# Patient Record
Sex: MALE | Race: WHITE
[De-identification: names, ages, dates, MRNs, and addresses within clinical notes are randomized per-mention and may not be internally consistent; named-entity substitution may affect disease eponyms.]

---

## 2023-03-09 ENCOUNTER — APPOINTMENT (OUTPATIENT)
Dept: RADIATION ONCOLOGY | Facility: CLINIC | Age: 78
End: 2023-03-09
Payer: MEDICARE

## 2023-03-09 VITALS — RESPIRATION RATE: 18 BRPM | BODY MASS INDEX: 39.99 KG/M2 | HEIGHT: 69 IN | WEIGHT: 270 LBS

## 2023-03-09 DIAGNOSIS — Z87.09 PERSONAL HISTORY OF OTHER DISEASES OF THE RESPIRATORY SYSTEM: ICD-10-CM

## 2023-03-09 DIAGNOSIS — Z87.891 PERSONAL HISTORY OF NICOTINE DEPENDENCE: ICD-10-CM

## 2023-03-09 DIAGNOSIS — Z86.79 PERSONAL HISTORY OF OTHER DISEASES OF THE CIRCULATORY SYSTEM: ICD-10-CM

## 2023-03-09 DIAGNOSIS — I10 ESSENTIAL (PRIMARY) HYPERTENSION: ICD-10-CM

## 2023-03-09 DIAGNOSIS — Z78.9 OTHER SPECIFIED HEALTH STATUS: ICD-10-CM

## 2023-03-09 PROBLEM — Z00.00 ENCOUNTER FOR PREVENTIVE HEALTH EXAMINATION: Status: ACTIVE | Noted: 2023-03-09

## 2023-03-09 PROCEDURE — 99204 OFFICE O/P NEW MOD 45 MIN: CPT

## 2023-03-09 NOTE — VITALS
[Maximal Pain Intensity: 2/10] : 2/10 [Least Pain Intensity: 0/10] : 0/10 [Pain Description/Quality: ___] : Pain description/quality: [unfilled] [Pain Duration: ___] : Pain duration: [unfilled] [Pain Location: ___] : Pain Location: [unfilled] [NoTreatment Scheduled] : no treatment scheduled [80: Normal activity with effort; some signs or symptoms of disease.] : 80: Normal activity with effort; some signs or symptoms of disease.  [ECOG Performance Status: 1 - Restricted in physically strenuous activity but ambulatory and able to carry out work of a light or sedentary nature] : Performance Status: 1 - Restricted in physically strenuous activity but ambulatory and able to carry out work of a light or sedentary nature, e.g., light house work, office work

## 2023-03-18 NOTE — REASON FOR VISIT
[Consideration of Curative Therapy] : consideration of curative therapy for [Spouse] : spouse [[employer] ] : provided by [employer]   [Interpreters_IDNumber] : [id number] [TWNoteComboBox1] : [place] Islander

## 2023-03-18 NOTE — HISTORY OF PRESENT ILLNESS
[FreeTextEntry1] : Mr. Helio Mcgill is a 76yo man presenting to discuss RT options for cancer of the head and neck.\par \par HPI:\par \par Thyroid US incidentally revealed a 2.4cm left level 3 node and 2 cm left level 2a node. 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 Nj 2/9/23 did not show any evidence of disease, recommended EUS with random biopsy of BOT, tonsil, and left neck dissection which was done 2/28/23. Intraoperatively found left level 2 ANTONIO. 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 He denies any new growths in the neck and is eating a normal diet. He denies weight loss. \par \par \par

## 2023-03-18 NOTE — PHYSICAL EXAM
[Sclera] : the sclera and conjunctiva were normal [PERRL With Normal Accommodation] : pupils were equal in size, round, reactive to light [] : no respiratory distress [Exaggerated Use Of Accessory Muscles For Inspiration] : no accessory muscle use [Normal] : oriented to person, place and time, the affect was normal, the mood was normal and not anxious [de-identified] : surgical incision along left neck healing well, no bleeding, appropriately tender, no masses in bilateral neck anitra regions

## 2023-03-18 NOTE — END OF VISIT
[] : Resident [FreeTextEntry3] : Agree with above. Discussed RT procedure and side effects. Side effects include but not limited to necrosis, PEG dependence, skin toxicity, pain, dry mouth, loss of taste, serious tissue toxicity. Will wait for final path report.  [Time Spent: ___ minutes] : I have spent [unfilled] minutes of time on the encounter. [>50% of the face to face encounter time was spent on counseling and/or coordination of care for ___] : Greater than 50% of the face to face encounter time was spent on counseling and/or coordination of care for [unfilled]

## 2023-03-20 ENCOUNTER — OUTPATIENT (OUTPATIENT)
Dept: OUTPATIENT SERVICES | Facility: HOSPITAL | Age: 78
LOS: 1 days | Discharge: HOME | End: 2023-03-20
Payer: MEDICARE

## 2023-03-20 PROCEDURE — 77334 RADIATION TREATMENT AID(S): CPT | Mod: 26

## 2023-03-21 DIAGNOSIS — C76.0 MALIGNANT NEOPLASM OF HEAD, FACE AND NECK: ICD-10-CM

## 2023-03-28 ENCOUNTER — APPOINTMENT (OUTPATIENT)
Dept: OTOLARYNGOLOGY | Facility: CLINIC | Age: 78
End: 2023-03-28
Payer: MEDICARE

## 2023-03-28 PROCEDURE — 92610 EVALUATE SWALLOWING FUNCTION: CPT | Mod: GN

## 2023-03-30 ENCOUNTER — NON-APPOINTMENT (OUTPATIENT)
Age: 78
End: 2023-03-30

## 2023-04-04 ENCOUNTER — NON-APPOINTMENT (OUTPATIENT)
Age: 78
End: 2023-04-04

## 2023-04-04 ENCOUNTER — APPOINTMENT (OUTPATIENT)
Dept: THORACIC SURGERY | Facility: CLINIC | Age: 78
End: 2023-04-04
Payer: MEDICARE

## 2023-04-04 VITALS
OXYGEN SATURATION: 87 % | HEART RATE: 64 BPM | BODY MASS INDEX: 38.65 KG/M2 | RESPIRATION RATE: 16 BRPM | HEIGHT: 70 IN | DIASTOLIC BLOOD PRESSURE: 69 MMHG | WEIGHT: 270 LBS | SYSTOLIC BLOOD PRESSURE: 122 MMHG

## 2023-04-04 DIAGNOSIS — C44.92 SQUAMOUS CELL CARCINOMA OF SKIN, UNSPECIFIED: ICD-10-CM

## 2023-04-04 PROCEDURE — 99205 OFFICE O/P NEW HI 60 MIN: CPT

## 2023-04-04 NOTE — CONSULT LETTER
[Dear  ___] : Dear  [unfilled], [Consult Letter:] : I had the pleasure of evaluating your patient, [unfilled]. [( Thank you for referring [unfilled] for consultation for _____ )] : Thank you for referring [unfilled] for consultation for [unfilled] [Please see my note below.] : Please see my note below. [Consult Closing:] : Thank you very much for allowing me to participate in the care of this patient.  If you have any questions, please do not hesitate to contact me. [Sincerely,] : Sincerely, [FreeTextEntry2] : Dr. Brett Garcia (rad/onc/ref) [FreeTextEntry3] : Ke Ruiz MD, FACS\par Chief, Division of Thoracic Surgery\par Director, Minimally Invasive Thoracic Surgery\par Department of Cardiovascular and Thoracic Surgery\par [facility]\par , Cardiovascular and Thoracic Surgery

## 2023-04-04 NOTE — ASSESSMENT
[FreeTextEntry1] : Mr. KEV DAIVS, 77 year old male, former smoker, w/ hx of HTN, HLD, asthma, CAD (no PCI), GALINA, who underwent a thyroid US revealing left neck mass and enlarged LN. S/p US guided bx on 02/01/2023 revealing metastatic keratinizing SCC with necrotic debris. S/p tonsil and left neck dissection on 02/28/2023 at [facility]. Plan for  RT (started 04/03/2023) with concurrent chemotherapy.\par \par Referred by Dr. Brett Garcia (rad/onc) for possible peg tube placement.\par \par I have reviewed the patient's medical records and diagnostic images at time of this office consultation and have made the following recommendation:\par 1. No scans available to review, however given patient's history I discussed EGD peg tube placement. Surgical approach reviewed with patient. Discussed risks in details. \par 2. He will re-think his option and call office with his decision. \par 3. Continue follow up with rad/onc.\par \par Recommendations reviewed with patient during this office visit, and all questions answered; Patient instructed on the importance of follow up and verbalizes understanding. \par \par \par I, TOMASZ House, personally performed the evaluation and management (E/M) services for this new patient.  That E/M includes conducting the initial examination, assessing all conditions, and establishing the plan of care.  Today, my ACP, José Fernandes NP, was here to observe my evaluation and management services for this patient to be followed going forward.

## 2023-04-04 NOTE — DATA REVIEWED
[FreeTextEntry1] : No scans available for review today. Only reports from CT Neck soft tissue with IV Contrast on 02/08/2023 and PET/CT on 02/06/2023.

## 2023-04-04 NOTE — HISTORY OF PRESENT ILLNESS
[FreeTextEntry1] : Mr. KEV DAVIS, 77 year old male, former smoker, w/ hx of HTN, HLD, asthma, CAD, GALINA, who underwent a thyroid US revealing left neck mass and enlarged LN. S/p US guided bx on 02/01/2023 revealing metastatic keratinizing SCC with necrotic debris. S/p tonsil and left neck dissection on 02/28/2023 at [facility]. Plan for  RT (started 04/03/2023) with concurrent chemotherapy.\par \par CT Neck soft tissue with IV Contrast on 02/08/2023 ([facility]):\par - Pathologically enlarged, heterogeneously enhancing left level 2 LNs.\par - There are additional rounded left level 3 nodes without pathologic enlargement.\par - No enhancing masslike less along the aerodisgestive tract. \par \par PET/CT on 02/06/2023 ([facility]):\par - FDG avid and non FDG avid left level 2 cystic/necrotic nodes biopsy proven metastatic squamous cell carcinoma.\par - FDG avid focus within the left inferior pole of thyroid, indeterminate. \par \par \par Patient is here today for CT Sx consultation, referred by Dr. Brett Garcia (rad/onc) for possible peg tube placement. Overall, he reports to be feeling well. Denies any chest pain, shortness of breath, cough, or hemoptysis.

## 2023-04-06 ENCOUNTER — NON-APPOINTMENT (OUTPATIENT)
Age: 78
End: 2023-04-06

## 2023-04-10 NOTE — DISEASE MANAGEMENT
[Clinical] : TNM Stage: c [DOMINIC] : DOMINIC [TTNM] : x [NTNM] : 3b [MTNM] : 0 [de-identified] : 66 Gy [de-identified] : oropharynx

## 2023-04-10 NOTE — REASON FOR VISIT
[Consideration of Curative Therapy] : consideration of curative therapy for [Spouse] : spouse [[employer] ] : provided by [employer]   [Interpreters_IDNumber] : [id number] [TWNoteComboBox1] : Lib[place]

## 2023-04-10 NOTE — HISTORY OF PRESENT ILLNESS
[FreeTextEntry1] : Mr. Helio Mcgill is a 76yo man presenting to discuss RT options for cancer of the head and neck.\par \par HPI:\par Thyroid US incidentally revealed a 2.4cm left level 3 node and 2 cm left level 2a node. 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 Nj 2/9/23 did not show any evidence of disease, recommended EUS with random biopsy of BOT, tonsil, and left neck dissection which was done 2/28/23. Intraoperatively found left level 2 ANTONIO. 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 He denies any new growths in the neck and is eating a normal diet. He denies weight loss. \par \par 4/6/2023 OTV. 4/33 fractions of radiation to oropharynx completed. c/o cough at times. No pain, dysphagia. Will get chemo tomorrow with Dr Pantoja @ [facility] . Pt will consider feeding tube placement when necessary. \par \par \par

## 2023-04-10 NOTE — REVIEW OF SYSTEMS
[Dysphagia: Grade 0] : Dysphagia: Grade 0 [Tinnitus - Grade 0] : Tinnitus - Grade 0 [Blurred Vision: Grade 0] : Blurred Vision: Grade 0 [Mucositis Oral: Grade 0] : Mucositis Oral: Grade 0  [Xerostomia: Grade 0] : Xerostomia: Grade 0 [Oral Pain: Grade 0] : Oral Pain: Grade 0 [Salivary duct inflammation: Grade 0] : Salivary duct inflammation: Grade 0 [Dysgeusia: Grade 0] : Dysgeusia: Grade 0 [Cough: Grade 1 - Mild symptoms; nonprescription intervention indicated] : Cough: Grade 1 - Mild symptoms; nonprescription intervention indicated [Negative] : Allergic/Immunologic [FreeTextEntry4] : left neck mass

## 2023-04-11 ENCOUNTER — NON-APPOINTMENT (OUTPATIENT)
Age: 78
End: 2023-04-11

## 2023-04-13 ENCOUNTER — NON-APPOINTMENT (OUTPATIENT)
Age: 78
End: 2023-04-13

## 2023-04-13 VITALS
HEART RATE: 89 BPM | RESPIRATION RATE: 18 BRPM | DIASTOLIC BLOOD PRESSURE: 83 MMHG | OXYGEN SATURATION: 88 % | SYSTOLIC BLOOD PRESSURE: 147 MMHG

## 2023-04-17 NOTE — REASON FOR VISIT
[Routine On-Treatment] : a routine on-treatment visit for [Spouse] : spouse [[employer] ] : provided by [employer]   [TWNoteComboBox1] : [place]raini [Interpreters_IDNumber] : [id number]

## 2023-04-17 NOTE — DISEASE MANAGEMENT
[Clinical] : TNM Stage: c [DOMINIC] : DOMINIC [TTNM] : x [NTNM] : 3b [MTNM] : 0 [de-identified] : 66 Gy [de-identified] : oropharynx

## 2023-04-17 NOTE — HISTORY OF PRESENT ILLNESS
[FreeTextEntry1] : Mr. Helio Mcgill is a 78yo man presenting to discuss RT options for cancer of the head and neck.\par \par HPI:\par Thyroid US incidentally revealed a 2.4cm left level 3 node and 2 cm left level 2a node. 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 Nj 2/9/23 did not show any evidence of disease, recommended EUS with random biopsy of BOT, tonsil, and left neck dissection which was done 2/28/23. Intraoperatively found left level 2 ANTONIO. 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 He denies any new growths in the neck and is eating a normal diet. He denies weight loss. \par \par 4/13/2023 OTV. 8/33 fractions of radiation to oropharynx completed. c/o cough at times. No pain, dysphagia. Pt has not started chemo yet with Dr Pantoja @ [facility] . Pt will consider feeding tube placement when necessary. Pt has lower O2 Sat since COVID infection about 2 years ago. \par \par \par

## 2023-05-18 ENCOUNTER — NON-APPOINTMENT (OUTPATIENT)
Age: 78
End: 2023-05-18

## 2023-05-23 ENCOUNTER — NON-APPOINTMENT (OUTPATIENT)
Age: 78
End: 2023-05-23

## 2023-05-23 VITALS
HEART RATE: 76 BPM | OXYGEN SATURATION: 96 % | DIASTOLIC BLOOD PRESSURE: 85 MMHG | RESPIRATION RATE: 18 BRPM | SYSTOLIC BLOOD PRESSURE: 150 MMHG

## 2023-05-24 PROCEDURE — 77334 RADIATION TREATMENT AID(S): CPT | Mod: 26

## 2023-05-26 ENCOUNTER — NON-APPOINTMENT (OUTPATIENT)
Age: 78
End: 2023-05-26

## 2023-06-22 ENCOUNTER — NON-APPOINTMENT (OUTPATIENT)
Age: 78
End: 2023-06-22

## 2023-06-22 ENCOUNTER — APPOINTMENT (OUTPATIENT)
Dept: ELECTROPHYSIOLOGY | Facility: CLINIC | Age: 78
End: 2023-06-22
Payer: MEDICARE

## 2023-06-22 VITALS
TEMPERATURE: 98 F | WEIGHT: 260 LBS | HEIGHT: 69 IN | HEART RATE: 70 BPM | SYSTOLIC BLOOD PRESSURE: 148 MMHG | DIASTOLIC BLOOD PRESSURE: 108 MMHG | BODY MASS INDEX: 38.51 KG/M2

## 2023-06-22 DIAGNOSIS — I44.2 ATRIOVENTRICULAR BLOCK, COMPLETE: ICD-10-CM

## 2023-06-22 PROCEDURE — 93280 PM DEVICE PROGR EVAL DUAL: CPT

## 2023-06-22 PROCEDURE — 99205 OFFICE O/P NEW HI 60 MIN: CPT

## 2023-06-22 PROCEDURE — 93000 ELECTROCARDIOGRAM COMPLETE: CPT | Mod: 59

## 2023-06-22 RX ORDER — GABAPENTIN 300 MG/1
300 CAPSULE ORAL TWICE DAILY
Refills: 0 | Status: ACTIVE | COMMUNITY

## 2023-06-22 RX ORDER — MONTELUKAST 10 MG/1
TABLET, FILM COATED ORAL
Refills: 0 | Status: ACTIVE | COMMUNITY

## 2023-06-22 RX ORDER — CLONIDINE 0.1 MG/24H
0.1 PATCH, EXTENDED RELEASE TRANSDERMAL DAILY
Refills: 0 | Status: ACTIVE | COMMUNITY

## 2023-06-22 RX ORDER — MIRABEGRON 50 MG/1
50 TABLET, FILM COATED, EXTENDED RELEASE ORAL
Qty: 30 | Refills: 2 | Status: ACTIVE | COMMUNITY

## 2023-06-22 RX ORDER — BUDESONIDE AND FORMOTEROL FUMARATE DIHYDRATE 160; 4.5 UG/1; UG/1
160-4.5 AEROSOL RESPIRATORY (INHALATION) TWICE DAILY
Qty: 1 | Refills: 5 | Status: ACTIVE | COMMUNITY

## 2023-06-22 RX ORDER — NITROGLYCERIN 0.4 MG/1
0.4 TABLET SUBLINGUAL
Qty: 100 | Refills: 6 | Status: ACTIVE | COMMUNITY

## 2023-06-22 RX ORDER — NIFEDIPINE 30 MG
30 TABLET, EXTENDED RELEASE ORAL DAILY
Refills: 0 | Status: ACTIVE | COMMUNITY

## 2023-06-22 RX ORDER — HYDRALAZINE HYDROCHLORIDE 25 MG/1
25 TABLET ORAL
Refills: 0 | Status: COMPLETED | COMMUNITY
End: 2023-06-22

## 2023-06-22 RX ORDER — ROSUVASTATIN CALCIUM 10 MG/1
10 TABLET, FILM COATED ORAL DAILY
Qty: 30 | Refills: 6 | Status: ACTIVE | COMMUNITY

## 2023-06-22 RX ORDER — IBUPROFEN 400 MG/1
400 TABLET, FILM COATED ORAL 3 TIMES DAILY
Qty: 90 | Refills: 5 | Status: ACTIVE | COMMUNITY

## 2023-06-22 RX ORDER — CHLORTHALIDONE 50 MG/1
50 TABLET ORAL
Refills: 0 | Status: COMPLETED | COMMUNITY
End: 2023-06-22

## 2023-06-22 RX ORDER — APIXABAN 5 MG/1
5 TABLET, FILM COATED ORAL
Qty: 3 | Refills: 3 | Status: ACTIVE | COMMUNITY

## 2023-06-22 RX ORDER — ERGOCALCIFEROL (VITAMIN D2) 1250 MCG
50000 CAPSULE ORAL
Refills: 0 | Status: ACTIVE | COMMUNITY

## 2023-06-22 RX ORDER — ASPIRIN 81 MG
81 TABLET, DELAYED RELEASE (ENTERIC COATED) ORAL
Refills: 0 | Status: COMPLETED | COMMUNITY
End: 2023-06-22

## 2023-06-22 RX ORDER — METOPROLOL SUCCINATE 100 MG/1
100 TABLET, EXTENDED RELEASE ORAL DAILY
Qty: 30 | Refills: 6 | Status: ACTIVE | COMMUNITY

## 2023-06-22 RX ORDER — ALBUTEROL SULFATE 90 UG/1
108 (90 BASE) AEROSOL, METERED RESPIRATORY (INHALATION)
Refills: 2 | Status: ACTIVE | COMMUNITY

## 2023-06-22 RX ORDER — FUROSEMIDE 20 MG/1
20 TABLET ORAL
Qty: 30 | Refills: 6 | Status: ACTIVE | COMMUNITY

## 2023-06-22 RX ORDER — MELOXICAM 10 MG/1
10 CAPSULE ORAL
Refills: 0 | Status: COMPLETED | COMMUNITY
End: 2023-06-22

## 2023-06-22 RX ORDER — OLMESARTAN MEDOXOMIL 40 MG/1
40 TABLET, FILM COATED ORAL
Qty: 90 | Refills: 3 | Status: ACTIVE | COMMUNITY

## 2023-06-22 RX ORDER — MIRABEGRON 50 MG/1
50 TABLET, FILM COATED, EXTENDED RELEASE ORAL
Refills: 0 | Status: COMPLETED | COMMUNITY
End: 2023-06-22

## 2023-06-22 RX ORDER — VALSARTAN 40 MG/1
TABLET, COATED ORAL
Refills: 0 | Status: COMPLETED | COMMUNITY
End: 2023-06-22

## 2023-06-22 NOTE — VITALS
[Maximal Pain Intensity: 1/10] : 1/10 [Least Pain Intensity: 0/10] : 0/10 [Pain Description/Quality: ___] : Pain description/quality: [unfilled] [Pain Duration: ___] : Pain duration: [unfilled] [Pain Location: ___] : Pain Location: [unfilled] [NoTreatment Scheduled] : no treatment scheduled [80: Normal activity with effort; some signs or symptoms of disease.] : 80: Normal activity with effort; some signs or symptoms of disease.  [ECOG Performance Status: 1 - Restricted in physically strenuous activity but ambulatory and able to carry out work of a light or sedentary nature] : Performance Status: 1 - Restricted in physically strenuous activity but ambulatory and able to carry out work of a light or sedentary nature, e.g., light house work, office work

## 2023-06-23 LAB
ALBUMIN SERPL ELPH-MCNC: 4.5 G/DL
ALP BLD-CCNC: 90 U/L
ALT SERPL-CCNC: 11 U/L
ANION GAP SERPL CALC-SCNC: 18 MMOL/L
AST SERPL-CCNC: 23 U/L
BILIRUB SERPL-MCNC: 0.4 MG/DL
BUN SERPL-MCNC: 18 MG/DL
CALCIUM SERPL-MCNC: 9.3 MG/DL
CHLORIDE SERPL-SCNC: 100 MMOL/L
CO2 SERPL-SCNC: 25 MMOL/L
CREAT SERPL-MCNC: 1.2 MG/DL
EGFR: 62 ML/MIN/1.73M2
GLUCOSE SERPL-MCNC: 99 MG/DL
MAGNESIUM SERPL-MCNC: 1.8 MG/DL
POTASSIUM SERPL-SCNC: 4.4 MMOL/L
PROT SERPL-MCNC: 7 G/DL
SODIUM SERPL-SCNC: 143 MMOL/L
TSH SERPL-ACNC: 3.27 UIU/ML

## 2023-06-25 NOTE — PROCEDURE
[No] : not [Atrial Fibrillation] : atrial fibrillation [Pacemaker] : pacemaker [DDD] : DDD [Longevity: ___ months] : The estimated remaining battery life is [unfilled] months [Threshold Testing Performed] : Threshold testing was performed [Lead Imp:  ___ohms] : lead impedance was [unfilled] ohms [Sensing Amplitude ___mv] : sensing amplitude was [unfilled] mv [___V @] : [unfilled] V [___ ms] : [unfilled] ms [de-identified] : [place] [de-identified] : [place] 8 NILSA [de-identified] : [id number] [de-identified] : 4/19/23 [de-identified] : DDD-CLS [de-identified] : AFL [place] 96%\par AP 2%  71%

## 2023-06-25 NOTE — HISTORY OF PRESENT ILLNESS
[de-identified] : Mr. DAVIS is a 78 year-year old male with history of non-obs CAD ([facility], 06/22), HTN, DL, GALINA, PVC, persistent atrial fibrillation, Tachy-ej syndrome s/p DC-PPM (Biotronik, 23, Non-dep), COPD, Ex-smoker, Metastatic keratinizing SCC s/p Sx + RT is sent here for management of device.\par \par Feels fine. \par RT is limited due to presence of device and being asked to move by Rad-onc expeditely.\par RT is being planned in both necks and PPM sites on pectoral region.\par \par He is accompanied by his wife. \par Lives in [place].\par \par EKG (6/22/23): AF-\par

## 2023-06-25 NOTE — ASSESSMENT
[FreeTextEntry1] : ## Complete heart block s/p DC-PPM (Bio, 23, Non-dep)\par ## Persistent Atrial Fibrillation \par ## Metastatic SCC\par \par - - On Eliquis. Compliant. No bleeding issues. Patient to contact us if there is any bleeding issues, interruption or any issues with OAC. Patient to go to ER/call 911 if any major bleeding. \par - PPM interrogation shows normally functioning DC-PPM. Battery life ok. No new events. \par - Discussed with Rad-onc. Device in path of radiation and limits necessary radiation for his metastatic SCC\par - Complex patient. Difficult situation. After detailed discussion, we decided with pacemaker removal and Micra AV implant.\par - Discussed with patient and available family members the risks, benefits, alternatives with respect to extraction. We discussed the need for cardiac surgery backup, general anesthesia, and femoral access. We discussed specific risks for extraction including a 1-2% risk of blood vessel/heart perforation requiring emergent surgery and risk of death. Other risks discussed include but were not limited to: bleeding, blood clot, pulmonary embolism, damage to the tricuspid valve, infection, heart attack, or stroke.\par - I discussed the risks, benefits and alternatives for device implantation with patient and available family members.  \par I reported a risk of major complications at 1% and minor complications at 3%. The details of the procedure and risks associated with undergoing the procedure were discussed in detail including but not limited to, death, myocardial ischemia, stroke, cardiac perforation, potential need for temporary pacemaker implantation, blood clot, blood collection requiring blood transfusion or repeat surgery (hematoma), infection, or vascular injury.  All questions were answered to satisfaction and the patient expressed verbal understanding of the procedure, the benefits, and risks. The patient agreed to proceed with the procedure.\par  \par   They expressed understanding and agreed to proceed.\par  \par - Hold OAC for 3 days\par - Return after device implant/explant.

## 2023-06-26 ENCOUNTER — APPOINTMENT (OUTPATIENT)
Dept: ELECTROPHYSIOLOGY | Facility: HOSPITAL | Age: 78
End: 2023-06-26

## 2023-06-26 ENCOUNTER — INPATIENT (INPATIENT)
Facility: HOSPITAL | Age: 78
LOS: 0 days | Discharge: HOME HEALTH | DRG: 229 | End: 2023-06-27
Attending: STUDENT IN AN ORGANIZED HEALTH CARE EDUCATION/TRAINING PROGRAM | Admitting: STUDENT IN AN ORGANIZED HEALTH CARE EDUCATION/TRAINING PROGRAM
Payer: MEDICARE

## 2023-06-26 ENCOUNTER — TRANSCRIPTION ENCOUNTER (OUTPATIENT)
Age: 78
End: 2023-06-26

## 2023-06-26 VITALS — HEIGHT: 69 IN | WEIGHT: 259.93 LBS

## 2023-06-26 DIAGNOSIS — I49.5 SICK SINUS SYNDROME: ICD-10-CM

## 2023-06-26 DIAGNOSIS — Z92.3 PERSONAL HISTORY OF IRRADIATION: Chronic | ICD-10-CM

## 2023-06-26 DIAGNOSIS — G47.33 OBSTRUCTIVE SLEEP APNEA (ADULT) (PEDIATRIC): ICD-10-CM

## 2023-06-26 DIAGNOSIS — H91.90 UNSPECIFIED HEARING LOSS, UNSPECIFIED EAR: ICD-10-CM

## 2023-06-26 DIAGNOSIS — I44.2 ATRIOVENTRICULAR BLOCK, COMPLETE: ICD-10-CM

## 2023-06-26 DIAGNOSIS — Z79.01 LONG TERM (CURRENT) USE OF ANTICOAGULANTS: ICD-10-CM

## 2023-06-26 DIAGNOSIS — Z95.5 PRESENCE OF CORONARY ANGIOPLASTY IMPLANT AND GRAFT: Chronic | ICD-10-CM

## 2023-06-26 DIAGNOSIS — Z85.21 PERSONAL HISTORY OF MALIGNANT NEOPLASM OF LARYNX: ICD-10-CM

## 2023-06-26 DIAGNOSIS — Z92.21 PERSONAL HISTORY OF ANTINEOPLASTIC CHEMOTHERAPY: Chronic | ICD-10-CM

## 2023-06-26 DIAGNOSIS — Z85.820 PERSONAL HISTORY OF MALIGNANT MELANOMA OF SKIN: ICD-10-CM

## 2023-06-26 DIAGNOSIS — Z95.5 PRESENCE OF CORONARY ANGIOPLASTY IMPLANT AND GRAFT: ICD-10-CM

## 2023-06-26 DIAGNOSIS — Z92.21 PERSONAL HISTORY OF ANTINEOPLASTIC CHEMOTHERAPY: ICD-10-CM

## 2023-06-26 DIAGNOSIS — C14.0 MALIGNANT NEOPLASM OF PHARYNX, UNSPECIFIED: Chronic | ICD-10-CM

## 2023-06-26 DIAGNOSIS — I49.3 VENTRICULAR PREMATURE DEPOLARIZATION: ICD-10-CM

## 2023-06-26 DIAGNOSIS — I48.19 OTHER PERSISTENT ATRIAL FIBRILLATION: ICD-10-CM

## 2023-06-26 DIAGNOSIS — I25.10 ATHEROSCLEROTIC HEART DISEASE OF NATIVE CORONARY ARTERY WITHOUT ANGINA PECTORIS: ICD-10-CM

## 2023-06-26 DIAGNOSIS — I48.91 UNSPECIFIED ATRIAL FIBRILLATION: Chronic | ICD-10-CM

## 2023-06-26 DIAGNOSIS — Z87.891 PERSONAL HISTORY OF NICOTINE DEPENDENCE: ICD-10-CM

## 2023-06-26 DIAGNOSIS — Z95.0 PRESENCE OF CARDIAC PACEMAKER: ICD-10-CM

## 2023-06-26 DIAGNOSIS — Z92.3 PERSONAL HISTORY OF IRRADIATION: ICD-10-CM

## 2023-06-26 DIAGNOSIS — I10 ESSENTIAL (PRIMARY) HYPERTENSION: ICD-10-CM

## 2023-06-26 DIAGNOSIS — J43.9 EMPHYSEMA, UNSPECIFIED: ICD-10-CM

## 2023-06-26 LAB
ALBUMIN SERPL ELPH-MCNC: 4.1 G/DL — SIGNIFICANT CHANGE UP (ref 3.5–5.2)
ALP SERPL-CCNC: 87 U/L — SIGNIFICANT CHANGE UP (ref 30–115)
ALT FLD-CCNC: 11 U/L — SIGNIFICANT CHANGE UP (ref 0–41)
ANION GAP SERPL CALC-SCNC: 14 MMOL/L — SIGNIFICANT CHANGE UP (ref 7–14)
APTT BLD: 25.3 SEC — LOW (ref 27–39.2)
AST SERPL-CCNC: 21 U/L — SIGNIFICANT CHANGE UP (ref 0–41)
BILIRUB DIRECT SERPL-MCNC: <0.2 MG/DL — SIGNIFICANT CHANGE UP (ref 0–0.3)
BILIRUB INDIRECT FLD-MCNC: >0.3 MG/DL — SIGNIFICANT CHANGE UP (ref 0.2–1.2)
BILIRUB SERPL-MCNC: 0.5 MG/DL — SIGNIFICANT CHANGE UP (ref 0.2–1.2)
BLD GP AB SCN SERPL QL: SIGNIFICANT CHANGE UP
BUN SERPL-MCNC: 16 MG/DL — SIGNIFICANT CHANGE UP (ref 10–20)
CALCIUM SERPL-MCNC: 9 MG/DL — SIGNIFICANT CHANGE UP (ref 8.4–10.5)
CHLORIDE SERPL-SCNC: 101 MMOL/L — SIGNIFICANT CHANGE UP (ref 98–110)
CO2 SERPL-SCNC: 27 MMOL/L — SIGNIFICANT CHANGE UP (ref 17–32)
CREAT SERPL-MCNC: 1.1 MG/DL — SIGNIFICANT CHANGE UP (ref 0.7–1.5)
EGFR: 69 ML/MIN/1.73M2 — SIGNIFICANT CHANGE UP
GLUCOSE SERPL-MCNC: 97 MG/DL — SIGNIFICANT CHANGE UP (ref 70–99)
HCT VFR BLD CALC: 43.4 % — SIGNIFICANT CHANGE UP (ref 42–52)
HGB BLD-MCNC: 14.2 G/DL — SIGNIFICANT CHANGE UP (ref 14–18)
INR BLD: 1.1 RATIO — SIGNIFICANT CHANGE UP (ref 0.65–1.3)
MAGNESIUM SERPL-MCNC: 1.9 MG/DL — SIGNIFICANT CHANGE UP (ref 1.8–2.4)
MCHC RBC-ENTMCNC: 31.5 PG — HIGH (ref 27–31)
MCHC RBC-ENTMCNC: 32.7 G/DL — SIGNIFICANT CHANGE UP (ref 32–37)
MCV RBC AUTO: 96.2 FL — HIGH (ref 80–94)
NRBC # BLD: 0 /100 WBCS — SIGNIFICANT CHANGE UP (ref 0–0)
PLATELET # BLD AUTO: 151 K/UL — SIGNIFICANT CHANGE UP (ref 130–400)
PMV BLD: 9.2 FL — SIGNIFICANT CHANGE UP (ref 7.4–10.4)
POTASSIUM SERPL-MCNC: 4.6 MMOL/L — SIGNIFICANT CHANGE UP (ref 3.5–5)
POTASSIUM SERPL-SCNC: 4.6 MMOL/L — SIGNIFICANT CHANGE UP (ref 3.5–5)
PROT SERPL-MCNC: 6.5 G/DL — SIGNIFICANT CHANGE UP (ref 6–8)
PROTHROM AB SERPL-ACNC: 12.6 SEC — SIGNIFICANT CHANGE UP (ref 9.95–12.87)
RBC # BLD: 4.51 M/UL — LOW (ref 4.7–6.1)
RBC # FLD: 15.7 % — HIGH (ref 11.5–14.5)
SODIUM SERPL-SCNC: 142 MMOL/L — SIGNIFICANT CHANGE UP (ref 135–146)
WBC # BLD: 6.77 K/UL — SIGNIFICANT CHANGE UP (ref 4.8–10.8)
WBC # FLD AUTO: 6.77 K/UL — SIGNIFICANT CHANGE UP (ref 4.8–10.8)

## 2023-06-26 PROCEDURE — C1769: CPT

## 2023-06-26 PROCEDURE — 71045 X-RAY EXAM CHEST 1 VIEW: CPT

## 2023-06-26 PROCEDURE — 94640 AIRWAY INHALATION TREATMENT: CPT

## 2023-06-26 PROCEDURE — 33233 REMOVAL OF PM GENERATOR: CPT

## 2023-06-26 PROCEDURE — 33235 REMOVAL PACEMAKER ELECTRODE: CPT

## 2023-06-26 PROCEDURE — 80076 HEPATIC FUNCTION PANEL: CPT

## 2023-06-26 PROCEDURE — 93005 ELECTROCARDIOGRAM TRACING: CPT | Mod: XU

## 2023-06-26 PROCEDURE — C1786: CPT

## 2023-06-26 PROCEDURE — 71045 X-RAY EXAM CHEST 1 VIEW: CPT | Mod: 26

## 2023-06-26 PROCEDURE — C1894: CPT

## 2023-06-26 PROCEDURE — 85730 THROMBOPLASTIN TIME PARTIAL: CPT

## 2023-06-26 PROCEDURE — 85610 PROTHROMBIN TIME: CPT

## 2023-06-26 PROCEDURE — 83735 ASSAY OF MAGNESIUM: CPT

## 2023-06-26 PROCEDURE — 71046 X-RAY EXAM CHEST 2 VIEWS: CPT

## 2023-06-26 PROCEDURE — 33274 TCAT INSJ/RPL PERM LDLS PM: CPT | Mod: Q0

## 2023-06-26 PROCEDURE — 86901 BLOOD TYPING SEROLOGIC RH(D): CPT

## 2023-06-26 PROCEDURE — 76937 US GUIDE VASCULAR ACCESS: CPT | Mod: 26,59

## 2023-06-26 PROCEDURE — 80048 BASIC METABOLIC PNL TOTAL CA: CPT

## 2023-06-26 PROCEDURE — 93279 PRGRMG DEV EVAL PM/LDLS PM: CPT

## 2023-06-26 PROCEDURE — 36415 COLL VENOUS BLD VENIPUNCTURE: CPT

## 2023-06-26 PROCEDURE — 85027 COMPLETE CBC AUTOMATED: CPT

## 2023-06-26 PROCEDURE — 86850 RBC ANTIBODY SCREEN: CPT

## 2023-06-26 PROCEDURE — C1889: CPT

## 2023-06-26 PROCEDURE — 86900 BLOOD TYPING SEROLOGIC ABO: CPT

## 2023-06-26 RX ORDER — ACETAMINOPHEN 500 MG
2 TABLET ORAL
Refills: 0 | DISCHARGE

## 2023-06-26 RX ORDER — ZOLPIDEM TARTRATE 10 MG/1
5 TABLET ORAL AT BEDTIME
Refills: 0 | Status: DISCONTINUED | OUTPATIENT
Start: 2023-06-26 | End: 2023-06-27

## 2023-06-26 RX ORDER — ATORVASTATIN CALCIUM 80 MG/1
80 TABLET, FILM COATED ORAL AT BEDTIME
Refills: 0 | Status: DISCONTINUED | OUTPATIENT
Start: 2023-06-26 | End: 2023-06-27

## 2023-06-26 RX ORDER — APIXABAN 2.5 MG/1
1 TABLET, FILM COATED ORAL
Refills: 0 | DISCHARGE

## 2023-06-26 RX ORDER — AMIODARONE HYDROCHLORIDE 400 MG/1
1 TABLET ORAL
Refills: 0 | DISCHARGE

## 2023-06-26 RX ORDER — NIFEDIPINE 30 MG
30 TABLET, EXTENDED RELEASE 24 HR ORAL DAILY
Refills: 0 | Status: DISCONTINUED | OUTPATIENT
Start: 2023-06-26 | End: 2023-06-27

## 2023-06-26 RX ORDER — FUROSEMIDE 40 MG
40 TABLET ORAL DAILY
Refills: 0 | Status: DISCONTINUED | OUTPATIENT
Start: 2023-06-26 | End: 2023-06-27

## 2023-06-26 RX ORDER — ACETAMINOPHEN 500 MG
650 TABLET ORAL EVERY 6 HOURS
Refills: 0 | Status: DISCONTINUED | OUTPATIENT
Start: 2023-06-26 | End: 2023-06-27

## 2023-06-26 RX ORDER — AMIODARONE HYDROCHLORIDE 400 MG/1
200 TABLET ORAL DAILY
Refills: 0 | Status: DISCONTINUED | OUTPATIENT
Start: 2023-06-26 | End: 2023-06-27

## 2023-06-26 RX ORDER — ALBUTEROL 90 UG/1
0.5 AEROSOL, METERED ORAL
Refills: 0 | DISCHARGE

## 2023-06-26 RX ORDER — OLMESARTAN MEDOXOMIL 5 MG/1
1 TABLET, FILM COATED ORAL
Refills: 0 | DISCHARGE

## 2023-06-26 RX ORDER — METOPROLOL TARTRATE 50 MG
1 TABLET ORAL
Refills: 0 | DISCHARGE

## 2023-06-26 RX ORDER — MIRABEGRON 50 MG/1
1 TABLET, EXTENDED RELEASE ORAL
Refills: 0 | DISCHARGE

## 2023-06-26 RX ORDER — MONTELUKAST 4 MG/1
1 TABLET, CHEWABLE ORAL
Refills: 0 | DISCHARGE

## 2023-06-26 RX ORDER — APIXABAN 2.5 MG/1
5 TABLET, FILM COATED ORAL EVERY 12 HOURS
Refills: 0 | Status: DISCONTINUED | OUTPATIENT
Start: 2023-06-28 | End: 2023-06-27

## 2023-06-26 RX ORDER — DULOXETINE HYDROCHLORIDE 30 MG/1
40 CAPSULE, DELAYED RELEASE ORAL DAILY
Refills: 0 | Status: DISCONTINUED | OUTPATIENT
Start: 2023-06-26 | End: 2023-06-27

## 2023-06-26 RX ORDER — DULOXETINE HYDROCHLORIDE 30 MG/1
1 CAPSULE, DELAYED RELEASE ORAL
Refills: 0 | DISCHARGE

## 2023-06-26 RX ORDER — CEFAZOLIN SODIUM 1 G
2000 VIAL (EA) INJECTION ONCE
Refills: 0 | Status: COMPLETED | OUTPATIENT
Start: 2023-06-26 | End: 2023-06-26

## 2023-06-26 RX ORDER — ZOLPIDEM TARTRATE 10 MG/1
1 TABLET ORAL
Refills: 0 | DISCHARGE

## 2023-06-26 RX ORDER — FUROSEMIDE 40 MG
1 TABLET ORAL
Refills: 0 | DISCHARGE

## 2023-06-26 RX ORDER — BUDESONIDE AND FORMOTEROL FUMARATE DIHYDRATE 160; 4.5 UG/1; UG/1
2 AEROSOL RESPIRATORY (INHALATION)
Refills: 0 | DISCHARGE

## 2023-06-26 RX ORDER — METOPROLOL TARTRATE 50 MG
100 TABLET ORAL EVERY 12 HOURS
Refills: 0 | Status: DISCONTINUED | OUTPATIENT
Start: 2023-06-26 | End: 2023-06-26

## 2023-06-26 RX ORDER — ALBUTEROL 90 UG/1
2.5 AEROSOL, METERED ORAL DAILY
Refills: 0 | Status: DISCONTINUED | OUTPATIENT
Start: 2023-06-26 | End: 2023-06-27

## 2023-06-26 RX ORDER — ROSUVASTATIN CALCIUM 5 MG/1
1 TABLET ORAL
Refills: 0 | DISCHARGE

## 2023-06-26 RX ORDER — MONTELUKAST 4 MG/1
10 TABLET, CHEWABLE ORAL DAILY
Refills: 0 | Status: DISCONTINUED | OUTPATIENT
Start: 2023-06-26 | End: 2023-06-27

## 2023-06-26 RX ORDER — BUDESONIDE AND FORMOTEROL FUMARATE DIHYDRATE 160; 4.5 UG/1; UG/1
2 AEROSOL RESPIRATORY (INHALATION)
Refills: 0 | Status: DISCONTINUED | OUTPATIENT
Start: 2023-06-26 | End: 2023-06-27

## 2023-06-26 RX ORDER — LOSARTAN POTASSIUM 100 MG/1
100 TABLET, FILM COATED ORAL DAILY
Refills: 0 | Status: DISCONTINUED | OUTPATIENT
Start: 2023-06-26 | End: 2023-06-27

## 2023-06-26 RX ORDER — METOPROLOL TARTRATE 50 MG
100 TABLET ORAL DAILY
Refills: 0 | Status: DISCONTINUED | OUTPATIENT
Start: 2023-06-26 | End: 2023-06-27

## 2023-06-26 RX ORDER — NIFEDIPINE 30 MG
1 TABLET, EXTENDED RELEASE 24 HR ORAL
Refills: 0 | DISCHARGE

## 2023-06-26 RX ORDER — OXYBUTYNIN CHLORIDE 5 MG
5 TABLET ORAL
Refills: 0 | Status: DISCONTINUED | OUTPATIENT
Start: 2023-06-26 | End: 2023-06-27

## 2023-06-26 RX ADMIN — Medication 100 MILLIGRAM(S): at 14:21

## 2023-06-26 RX ADMIN — BUDESONIDE AND FORMOTEROL FUMARATE DIHYDRATE 2 PUFF(S): 160; 4.5 AEROSOL RESPIRATORY (INHALATION) at 21:40

## 2023-06-26 RX ADMIN — Medication 5 MILLIGRAM(S): at 19:01

## 2023-06-26 RX ADMIN — Medication 650 MILLIGRAM(S): at 17:00

## 2023-06-26 RX ADMIN — ATORVASTATIN CALCIUM 80 MILLIGRAM(S): 80 TABLET, FILM COATED ORAL at 21:41

## 2023-06-26 NOTE — DISCHARGE NOTE PROVIDER - CARE PROVIDER_API CALL
Jackie Recio  Cardiac Electrophysiology  [address] [place] [id number]  Phone: [phone]  Fax: [phone]  Established Patient  Follow Up Time: 1 week

## 2023-06-26 NOTE — H&P ADULT - ASSESSMENT
78 year-year old male with history of non-obs CAD ([facility], 06/22), HTN, DL, GALINA, PVC, persistent atrial fibrillation, Tachy-ej syndrome s/p DC-PPM (Biotronik, 23, Non-dep), COPD, Ex-smoker, Metastatic keratinizing SCC s/p Sx + RT , RT is limited due to presence of device, presents for elective DC PPM explantations and leadless PPM implantation    proceed with procedure  f/u labs  abx ptx per protocol  CXR per protocol  Hold Eliquis 48hrs post op  Keflex for 5 days postop  anticipated d/c in AM

## 2023-06-26 NOTE — PATIENT PROFILE ADULT - FALL HARM RISK - UNIVERSAL INTERVENTIONS
Bed in lowest position, wheels locked, appropriate side rails in place/Call bell, personal items and telephone in reach/Instruct patient to call for assistance before getting out of bed or chair/Non-slip footwear when patient is out of bed/[place] to call system/Physically safe environment - no spills, clutter or unnecessary equipment/Purposeful Proactive Rounding/Room/bathroom lighting operational, light cord in reach

## 2023-06-26 NOTE — DISCHARGE NOTE PROVIDER - ATTENDING DISCHARGE PHYSICAL EXAMINATION:
I saw and evaluated the patient the day of discharge.  They are s/p PPM device extraction and leadless PPM insertion.  On the day of discharge they are hemodynamically stable, comfortable appearing, and without complaints.  All questions and concerns were addressed.  Patient is stable for discharge and close follow up with Dr. Recio.

## 2023-06-26 NOTE — DISCHARGE NOTE PROVIDER - NSDCCPCAREPLAN_GEN_ALL_CORE_FT
PRINCIPAL DISCHARGE DIAGNOSIS  Diagnosis: SSS (sick sinus syndrome)  Assessment and Plan of Treatment:

## 2023-06-26 NOTE — H&P ADULT - HISTORY OF PRESENT ILLNESS
78 year-year old male with history of non-obs CAD ([facility], 06/22), HTN, DL, GALINA, PVC, persistent atrial fibrillation, Tachy-ej syndrome s/p DC-PPM (Biotronik, 23, Non-dep), COPD, Ex-smoker, Metastatic keratinizing SCC s/p Sx + RT ,  RT is limited due to presence of device and being asked to move by Rad-onc expeditely.  RT is being planned in both necks and PPM sites on pectoral region.  Feels fine.

## 2023-06-26 NOTE — DISCHARGE NOTE PROVIDER - NSDCMRMEDTOKEN_GEN_ALL_CORE_FT
albuterol 5 mg/mL (0.5%) inhalation solution: 0.5 by nebulizer prn  Ambien 10 mg oral tablet: 1 orally once a day  amiodarone 200 mg oral tablet: 1 orally once a day  cloNIDine 0.1 mg oral tablet: 1 orally prn  DULoxetine 40 mg oral delayed release capsule: 1 orally once a day  Eliquis 5 mg oral tablet: 1 orally 2 times a day  furosemide 40 mg oral tablet: 1 orally once a day  metoprolol tartrate 100 mg oral tablet: 1 orally once a day  montelukast 10 mg oral tablet: 1 orally prn  Myrbetriq 50 mg oral tablet, extended release: 1 orally once a day  NIFEdipine 30 mg oral tablet, extended release: 1 orally once a day  olmesartan 40 mg oral tablet: 1 orally once a day  rosuvastatin 20 mg oral tablet: 1 orally once a day  Symbicort 160 mcg-4.5 mcg/inh inhalation aerosol: 2 inhaled prn

## 2023-06-26 NOTE — PATIENT PROFILE ADULT - FUNCTIONAL SCREEN CURRENT LEVEL: COMMUNICATION, MLM
Isacc Garcia NP and Dr. Richard Morales notified patient assisting upon leaving and states she has the right to leave. Isacc Garcia NP came back to talk with patient. Patient daughter here to see patient. Isacc Garcia talked with daughter. Isacc Garcia NP and Dr. Richard Morales do not feel patient is able to be held against her will. Daughter whom is 25 and patient left ED. Patient was not willing to sign AMA form.       Lakesha Simmons RN  07/28/20 4926 0 = understands/communicates without difficulty

## 2023-06-26 NOTE — DISCHARGE NOTE PROVIDER - NSDCCPTREATMENT_GEN_ALL_CORE_FT
PRINCIPAL PROCEDURE  Procedure: Insertion, pacemaker, leadless  Findings and Treatment: - Resume Eliquis 6/29/23 in am  - Do not shower for 1 week  - Do not drive or operate heavy machinery for 1 week  - Do not submerge in water (example: baths, swimming) for 1 month.  - Do not lift your left arm greater than shoulder height for 6 weeks.  - Do not lift anything heavier than 5-10 lbs with your left arm for 6 weeks.  - Any sudden swelling, redness, fever, discharge, or severe pain, call the Electrophysiology Office at [phone].

## 2023-06-26 NOTE — DISCHARGE NOTE PROVIDER - NSDCFUSCHEDAPPT_GEN_ALL_CORE_FT
no
[facility]  ELECTROPH [address] [place] [address]  Scheduled Appointment: 07/05/2023

## 2023-06-26 NOTE — DISEASE MANAGEMENT
[Clinical] : TNM Stage: c [DOMINIC] : DOMINIC [TTNM] : x [NTNM] : 3b [MTNM] : 0 [de-identified] : 66 Gy [de-identified] : oropharynx

## 2023-06-26 NOTE — DISCHARGE NOTE PROVIDER - HOSPITAL COURSE
78 year-year old male with history of non-obs CAD ([facility], 06/22), HTN, DL, GALINA, PVC, persistent atrial fibrillation, Tachy-ej syndrome s/p DC-PPM (Biotronik, 23, Non-dep), COPD, Ex-smoker, Metastatic keratinizing SCC s/p Sx + RT ,RT had been limited due to presence of device and on 6/26/23 patient underwent device extraction and leadless pacemaker implantation. Patient was monitored overnight. POD 1 patient remains HD Stable with no complaints. Examination of RCFV access site is C/D/I With no hematoma or erythema. Examination of chest wall incision is C/D/I with no erythema or hematoma. Patient is being DC home in stable condition.

## 2023-06-26 NOTE — ASU PATIENT PROFILE, ADULT - NSICDXPASTMEDICALHX_GEN_ALL_CORE_FT
PAST MEDICAL HISTORY:  CAD (coronary artery disease)     Emphysema of lung     Hard of hearing     Hearing deficit     Hypertension     GALINA (obstructive sleep apnea)

## 2023-06-26 NOTE — HISTORY OF PRESENT ILLNESS
[FreeTextEntry1] : Mr. Helio Mcgill is a 77 yo man presenting to discuss RT options for cancer of the head and neck.\par \par HPI:\par Thyroid US incidentally revealed a 2.4cm left level 3 node and 2 cm left level 2a node. 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 Nj 2/9/23 did not show any evidence of disease, recommended EUS with random biopsy of BOT, tonsil, and left neck dissection which was done 2/28/23. Intraoperatively found left level 2 ANTONIO. 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 He denies any new growths in the neck and is eating a normal diet. He denies weight loss. \par \par 4/13/2023 OTV. 8/33 fractions of radiation to oropharynx completed. c/o cough at times. No pain, dysphagia. Pt has not started chemo yet with Dr Pantoja @ [facility] . Pt will consider feeding tube placement when necessary. Pt has lower O2 Sat since COVID infection about 2 years ago. \par \par 6/22/2023 OTV. 14/33 fractions of RT to oropharynx completed. Pt is back for RT from hospitalization for heart condition and pacemaker. \par \par \par

## 2023-06-26 NOTE — ASU PATIENT PROFILE, ADULT - NSICDXPASTSURGICALHX_GEN_ALL_CORE_FT
PAST SURGICAL HISTORY:  History of chemotherapy     S/P radiation therapy     Stented coronary artery     Throat cancer     Unspecified atrial fibrillation

## 2023-06-26 NOTE — REASON FOR VISIT
[Routine On-Treatment] : a routine on-treatment visit for [Spouse] : spouse [[employer] ] : provided by [employer]   [Interpreters_IDNumber] : [id number] [TWNoteComboBox1] : Sen[place]alese

## 2023-06-27 ENCOUNTER — TRANSCRIPTION ENCOUNTER (OUTPATIENT)
Age: 78
End: 2023-06-27

## 2023-06-27 VITALS
HEART RATE: 82 BPM | DIASTOLIC BLOOD PRESSURE: 92 MMHG | RESPIRATION RATE: 18 BRPM | TEMPERATURE: 98 F | SYSTOLIC BLOOD PRESSURE: 135 MMHG

## 2023-06-27 PROBLEM — I25.10 ATHEROSCLEROTIC HEART DISEASE OF NATIVE CORONARY ARTERY WITHOUT ANGINA PECTORIS: Chronic | Status: ACTIVE | Noted: 2023-06-26

## 2023-06-27 PROBLEM — H91.90 UNSPECIFIED HEARING LOSS, UNSPECIFIED EAR: Chronic | Status: ACTIVE | Noted: 2023-06-26

## 2023-06-27 PROBLEM — J43.9 EMPHYSEMA, UNSPECIFIED: Chronic | Status: ACTIVE | Noted: 2023-06-26

## 2023-06-27 PROCEDURE — 99238 HOSP IP/OBS DSCHRG MGMT 30/<: CPT

## 2023-06-27 PROCEDURE — 93010 ELECTROCARDIOGRAM REPORT: CPT

## 2023-06-27 PROCEDURE — 93279 PRGRMG DEV EVAL PM/LDLS PM: CPT | Mod: 26

## 2023-06-27 PROCEDURE — 99233 SBSQ HOSP IP/OBS HIGH 50: CPT | Mod: 24

## 2023-06-27 PROCEDURE — 71046 X-RAY EXAM CHEST 2 VIEWS: CPT | Mod: 26

## 2023-06-27 RX ORDER — CEFAZOLIN SODIUM 1 G
2000 VIAL (EA) INJECTION ONCE
Refills: 0 | Status: COMPLETED | OUTPATIENT
Start: 2023-06-27 | End: 2023-06-27

## 2023-06-27 RX ADMIN — LOSARTAN POTASSIUM 100 MILLIGRAM(S): 100 TABLET, FILM COATED ORAL at 05:43

## 2023-06-27 RX ADMIN — Medication 40 MILLIGRAM(S): at 05:42

## 2023-06-27 RX ADMIN — DULOXETINE HYDROCHLORIDE 40 MILLIGRAM(S): 30 CAPSULE, DELAYED RELEASE ORAL at 12:16

## 2023-06-27 RX ADMIN — Medication 30 MILLIGRAM(S): at 05:42

## 2023-06-27 RX ADMIN — Medication 100 MILLIGRAM(S): at 05:42

## 2023-06-27 RX ADMIN — MONTELUKAST 10 MILLIGRAM(S): 4 TABLET, CHEWABLE ORAL at 12:16

## 2023-06-27 RX ADMIN — AMIODARONE HYDROCHLORIDE 200 MILLIGRAM(S): 400 TABLET ORAL at 05:43

## 2023-06-27 RX ADMIN — Medication 100 MILLIGRAM(S): at 08:27

## 2023-06-27 RX ADMIN — BUDESONIDE AND FORMOTEROL FUMARATE DIHYDRATE 2 PUFF(S): 160; 4.5 AEROSOL RESPIRATORY (INHALATION) at 08:30

## 2023-06-27 RX ADMIN — Medication 5 MILLIGRAM(S): at 05:42

## 2023-06-27 NOTE — PROGRESS NOTE ADULT - SUBJECTIVE AND OBJECTIVE BOX
INTERVAL HPI/OVERNIGHT EVENTS:  No acute events overnight  Patient SP D/C PPM extraction and Micra Leadless PPM implant, POD#1  HD and feeling well    MEDICATIONS  (STANDING):  albuterol   0.5% 2.5 milliGRAM(s) Nebulizer daily  aMIOdarone    Tablet 200 milliGRAM(s) Oral daily  atorvastatin 80 milliGRAM(s) Oral at bedtime  budesonide 160 MICROgram(s)/formoterol 4.5 MICROgram(s) Inhaler 2 Puff(s) Inhalation two times a day  DULoxetine 40 milliGRAM(s) Oral daily  furosemide    Tablet 40 milliGRAM(s) Oral daily  losartan 100 milliGRAM(s) Oral daily  metoprolol succinate  milliGRAM(s) Oral daily  montelukast 10 milliGRAM(s) Oral daily  NIFEdipine XL 30 milliGRAM(s) Oral daily  oxybutynin 5 milliGRAM(s) Oral two times a day    MEDICATIONS  (PRN):  acetaminophen     Tablet .. 650 milliGRAM(s) Oral every 6 hours PRN Moderate Pain (4 - 6)  zolpidem 5 milliGRAM(s) Oral at bedtime PRN Insomnia  zolpidem 5 milliGRAM(s) Oral at bedtime PRN Insomnia      Allergies    No Known Allergies    Intolerances        REVIEW OF SYSTEMS: No CP, palpitations, dizziness or SOB    Vital Signs Last 24 Hrs  T(C): 36.8 (27 Jun 2023 08:03), Max: 36.8 (27 Jun 2023 08:03)  T(F): 98.2 (27 Jun 2023 08:03), Max: 98.2 (27 Jun 2023 08:03)  HR: 82 (27 Jun 2023 08:03) (58 - 82)  BP: 135/92 (27 Jun 2023 08:03) (115/56 - 139/75)  BP(mean): 107 (27 Jun 2023 08:03) (81 - 107)  RR: 18 (27 Jun 2023 08:03) (18 - 19)  SpO2: 89% (27 Jun 2023 04:03) (89% - 94%)    Parameters below as of 27 Jun 2023 04:03  Patient On (Oxygen Delivery Method): nasal cannula  O2 Flow (L/min): 3      06-26-23 @ 07:01  -  06-27-23 @ 07:00  --------------------------------------------------------  IN: 60 mL / OUT: 800 mL / NET: -740 mL    06-27-23 @ 07:01  -  06-27-23 @ 12:22  --------------------------------------------------------  IN: 0 mL / OUT: 320 mL / NET: -320 mL        Physical Exam  GENERAL: In no apparent distress, well nourished, and hydrated.  EYES: EOMI, PERRLA, conjunctiva and sclera clear  NECK: Supple  HEART: Regular rate and rhythm; No murmurs, rubs, or gallops.  PULMONARY: Clear to auscultation and perfusion.  No rales, wheezing, or rhonchi bilaterally.  EXTREMITIES:  2+ Peripheral Pulses, No clubbing, cyanosis, or edema  SKIN: Suture removed from R groin, no hematoma BL  NEUROLOGICAL: Grossly nonfocal    LABS:                        14.2   6.77  )-----------( 151      ( 26 Jun 2023 11:24 )             43.4     06-26    142  |  101  |  16  ----------------------------<  97  4.6   |  27  |  1.1    Ca    9.0      26 Jun 2023 11:24  Mg     1.9     06-26    TPro  6.5  /  Alb  4.1  /  TBili  0.5  /  DBili  <0.2  /  AST  21  /  ALT  11  /  AlkPhos  87  06-26    PT/INR - ( 26 Jun 2023 11:24 )   PT: 12.60 sec;   INR: 1.10 ratio         PTT - ( 26 Jun 2023 11:24 )  PTT:25.3 sec  Urinalysis Basic - ( 26 Jun 2023 11:24 )    Color: x / Appearance: x / SG: x / pH: x  Gluc: 97 mg/dL / Ketone: x  / Bili: x / Urobili: x   Blood: x / Protein: x / Nitrite: x   Leuk Esterase: x / RBC: x / WBC x   Sq Epi: x / Non Sq Epi: x / Bacteria: x        RADIOLOGY & ADDITIONAL TESTS:

## 2023-06-27 NOTE — PROGRESS NOTE ADULT - ASSESSMENT
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 Patient POD#1 and feeling well    Impression:  Tachybrady sp DC PPM Extraction and Micra Implant  Persistent AF  Metastatic SCC  HTN  HLD    Plan:  - CXR completed  - Interrogation completed, numbers within appropriate range  - No anticoagulation 48 hours postop  - Continue all other home medications  - Leave dressing in place until follow up appt  - No heavy lifting for one week  - Follow up with Dr Recio in one week for wound check

## 2023-06-27 NOTE — DISCHARGE NOTE NURSING/CASE MANAGEMENT/SOCIAL WORK - NSDCPEPT PROEDMA_GEN_ALL_CORE
----- Message from Sidra Broussard sent at 12/3/2019  4:14 PM CST -----  Contact: Patient   Type: RX Refill Request    Who Called: Patient    Have you contacted your pharmacy: Yes     Refill or New Rx: Refill     RX Name and Strength: ranitidine (ZANTAC) 300 MG tablet    How is the patient currently taking it? (ex. 1XDay):    Is this a 30 day or 90 day RX: 30    Preferred Pharmacy with phone number:   [facility] #[id number] - [address] AT [address] [address]  Phone: [phone] Fax: [phone]          Local or Mail Order: Local     Ordering Provider: Dr. Lombard     Would the patient rather a call back or a response via My [facility]? Call back     Best Call Back Number: [phone]          
Yes

## 2023-06-27 NOTE — DISCHARGE NOTE NURSING/CASE MANAGEMENT/SOCIAL WORK - PATIENT PORTAL LINK FT
You can access the FollowMyHealth Patient Portal offered by [facility] by registering at the following website: [email or web address]. By joining [email or web address]’s FollowMyHealth portal, you will also be able to view your health information using other applications (apps) compatible with our system.

## 2023-06-28 PROBLEM — G47.33 OBSTRUCTIVE SLEEP APNEA (ADULT) (PEDIATRIC): Chronic | Status: ACTIVE | Noted: 2023-06-26

## 2023-06-28 PROBLEM — I10 ESSENTIAL (PRIMARY) HYPERTENSION: Chronic | Status: ACTIVE | Noted: 2023-06-26

## 2023-06-29 ENCOUNTER — NON-APPOINTMENT (OUTPATIENT)
Age: 78
End: 2023-06-29

## 2023-07-03 ENCOUNTER — NON-APPOINTMENT (OUTPATIENT)
Age: 78
End: 2023-07-03

## 2023-07-03 DIAGNOSIS — T81.40XS: ICD-10-CM

## 2023-07-05 ENCOUNTER — APPOINTMENT (OUTPATIENT)
Dept: ELECTROPHYSIOLOGY | Facility: CLINIC | Age: 78
End: 2023-07-05
Payer: MEDICARE

## 2023-07-05 VITALS
DIASTOLIC BLOOD PRESSURE: 82 MMHG | WEIGHT: 260 LBS | HEART RATE: 70 BPM | BODY MASS INDEX: 38.51 KG/M2 | HEIGHT: 69 IN | SYSTOLIC BLOOD PRESSURE: 134 MMHG

## 2023-07-05 PROBLEM — H91.90 UNSPECIFIED HEARING LOSS, UNSPECIFIED EAR: Chronic | Status: ACTIVE | Noted: 2023-06-26

## 2023-07-05 PROCEDURE — 93279 PRGRMG DEV EVAL PM/LDLS PM: CPT

## 2023-07-05 PROCEDURE — 99214 OFFICE O/P EST MOD 30 MIN: CPT | Mod: 24

## 2023-07-05 PROCEDURE — 93000 ELECTROCARDIOGRAM COMPLETE: CPT | Mod: 59

## 2023-07-06 ENCOUNTER — APPOINTMENT (OUTPATIENT)
Dept: RADIATION ONCOLOGY | Facility: CLINIC | Age: 78
End: 2023-07-06

## 2023-07-06 ENCOUNTER — NON-APPOINTMENT (OUTPATIENT)
Age: 78
End: 2023-07-06

## 2023-07-06 NOTE — DISEASE MANAGEMENT
[Clinical] : TNM Stage: c [DOMINIC] : DOMINIC [TTNM] : x [NTNM] : 3b [MTNM] : 0 [de-identified] : 66 Gy [de-identified] : oropharynx

## 2023-07-06 NOTE — PROCEDURE
[No] : not [Atrial Fibrillation] : atrial fibrillation [See Scanned Paceart Report] : See scanned paceart report [See Device Printout] : See device printout [Pacemaker] : pacemaker [DDD] : DDD [Longevity: ___ months] : The estimated remaining battery life is [unfilled] months [Threshold Testing Performed] : Threshold testing was performed [Ventricular] : Ventricular [Lead Imp:  ___ohms] : lead impedance was [unfilled] ohms [Sensing Amplitude ___mv] : sensing amplitude was [unfilled] mv [___V @] : [unfilled] V [___ ms] : [unfilled] ms [de-identified] : Medtronic [de-identified] : [id number] [de-identified] : Mary AV2 [de-identified] : 6/26/23 [de-identified] : ERNESTINE [de-identified] : 50 [de-identified] : VS Only 70.7% --(intact AV conduction is 38% of this)\par AM- 23.2%\par  Only 5.8%

## 2023-07-06 NOTE — PHYSICAL EXAM
[General Appearance - Well Developed] : well developed [Normal Appearance] : normal appearance [Well Groomed] : well groomed [General Appearance - Well Nourished] : well nourished [No Deformities] : no deformities [General Appearance - In No Acute Distress] : no acute distress [Irregularly Irregular] : the rhythm was irregularly irregular [Left Infraclavicular] : left infraclavicular area [Clean] : clean [Dry] : dry [Healing Well] : healing well [Bleeding] : no active bleeding [Foul Odor] : no foul smell [Purulent Drainage] : no purulent drainage [Serosanguineous Drainage] : no serosanquineous drainage [Serous Drainage] : serous drainage [Erythema] : not erythematous [Warm] : not warm [Tender] : not tender [Indurated] : not indurated [Fluctuant] : not fluctuant

## 2023-07-06 NOTE — REVIEW OF SYSTEMS
[Negative] : Allergic/Immunologic [Dysphagia: Grade 0] : Dysphagia: Grade 0 [Tinnitus - Grade 0] : Tinnitus - Grade 0 [Blurred Vision: Grade 0] : Blurred Vision: Grade 0 [Mucositis Oral: Grade 0] : Mucositis Oral: Grade 0  [Xerostomia: Grade 0] : Xerostomia: Grade 0 [Oral Pain: Grade 0] : Oral Pain: Grade 0 [Salivary duct inflammation: Grade 0] : Salivary duct inflammation: Grade 0 [Dysgeusia: Grade 0] : Dysgeusia: Grade 0 [Cough: Grade 1 - Mild symptoms; nonprescription intervention indicated] : Cough: Grade 1 - Mild symptoms; nonprescription intervention indicated [Alopecia: Grade 0] : Alopecia: Grade 0 [Skin Atrophy: Grade 0] : Skin Atrophy: Grade 0 [Pruritus: Grade 0] : Pruritus: Grade 0 [Skin Hyperpigmentation: Grade 0] : Skin Hyperpigmentation: Grade 0 [Skin Induration: Grade 0] : Skin Induration: Grade 0 [Dermatitis Radiation: Grade 0] : Dermatitis Radiation: Grade 0 [FreeTextEntry4] : left neck mass

## 2023-07-06 NOTE — ASSESSMENT
[FreeTextEntry1] : ## Tachy-ej syndrome s/p DC-PPM (Bio, 23, Non-dep) s/p removal and Micra AV2 (MDT, 23, Non-dep)\par ## Persistent Atrial Fibrillation \par ## Metastatic SCC\par \par - - On Eliquis. Compliant. No bleeding issues. Patient to contact us if there is any bleeding issues, interruption or any issues with OAC. Patient to go to ER/call 911 if any major bleeding. \par - PPM interrogation shows normally functioning Micra AV2. Battery life ok. \par - Wound examined. Serous/sero-sanguinous minimal discharge. No local or systemic signs of infection\par - Will continue with prophylactic antibiotics\par - Ok to proceed with radiation. Risk/benefits discussed. Benefits of radiation outweighs possible risk of delayed healing.\par - Remote Monitoring \par - Return in 1 month for device check considering daily radiation need.\par - D/w daughter and rad-onc (Phone).

## 2023-07-06 NOTE — HISTORY OF PRESENT ILLNESS
[de-identified] : Mr. 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 \par RT is limited due to presence of device and being asked to move by Rad-onc expeditely.\par RT is being planned in both necks and PPM sites on pectoral region.\par \par 7/5/23: Feels fine. s/p pacemaker removal and Micra implant. There was a concern about drainage from wound by VNS.\par \par He is accompanied by his wife. \anatoly Lives in [place]. His Daughter is a pathologist in [place].\par \par EKG (07/5/23): AF@76\par EKG (6/22/23): AF-\par

## 2023-07-06 NOTE — REASON FOR VISIT
[Routine Follow-Up] : routine follow-up visit for [Spouse] : spouse [[employer] ] : provided by [employer]   [Interpreters_IDNumber] : [id number] [TWNoteComboBox1] : Welsh

## 2023-07-06 NOTE — HISTORY OF PRESENT ILLNESS
[FreeTextEntry1] : Mr. Helio Mcgill is a 79 yo man presenting to discuss RT options for cancer of the head and neck.\par \par HPI:\par Thyroid US incidentally revealed a 2.4cm left level 3 node and 2 cm left level 2a node. 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 Nj 2/9/23 did not show any evidence of disease, recommended EUS with random biopsy of BOT, tonsil, and left neck dissection which was done 2/28/23. Intraoperatively found left level 2 ANTONIO. 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 He denies any new growths in the neck and is eating a normal diet. He denies weight loss. \par \par 4/13/2023 OTV. 8/33 fractions of radiation to oropharynx completed. c/o cough at times. No pain, dysphagia. Pt has not started chemo yet with Dr Pantoja @ [facility] . Pt will consider feeding tube placement when necessary. Pt has lower O2 Sat since COVID infection about 2 years ago. \par \par 6/22/2023 OTV. 14/33 fractions of RT to oropharynx completed. Pt is back for RT from hospitalization for heart condition and pacemaker. \par \par 7/6/23 F/U. \par

## 2023-07-09 NOTE — HISTORY OF PRESENT ILLNESS
[FreeTextEntry1] : Mr. Helio Mcgill is a 77 yo man presenting to discuss RT options for cancer of the head and neck.\par \par HPI:\par Thyroid US incidentally revealed a 2.4cm left level 3 node and 2 cm left level 2a node. 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 Nj 2/9/23 did not show any evidence of disease, recommended EUS with random biopsy of BOT, tonsil, and left neck dissection which was done 2/28/23. Intraoperatively found left level 2 ANTONIO. 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 He denies any new growths in the neck and is eating a normal diet. He denies weight loss. \par \par 4/13/2023 OTV. 8/33 fractions of radiation to oropharynx completed. c/o cough at times. No pain, dysphagia. Pt has not started chemo yet with Dr Pantoja @ [facility] . Pt will consider feeding tube placement when necessary. Pt has lower O2 Sat since COVID infection about 2 years ago. \par \par 6/22/2023 OTV. 14/33 fractions of RT to oropharynx completed. Pt is back for RT from hospitalization for heart condition and pacemaker. \par 7/6/23 OTV. 15/33 fractions of RT to oropharynx completed. Pt is back for RT from hospitalization for heart condition and pacemaker. c/o minor skin infection over pacemaker placement site.  \par \par \par

## 2023-07-09 NOTE — DISEASE MANAGEMENT
[Clinical] : TNM Stage: c [DOMINIC] : DOMINIC [TTNM] : x [NTNM] : 3b [MTNM] : 0 [de-identified] : 66 Gy [de-identified] : oropharynx

## 2023-07-09 NOTE — REASON FOR VISIT
[Routine On-Treatment] : a routine on-treatment visit for [Spouse] : spouse [TWNoteComboBox1] : Colombian

## 2023-07-12 ENCOUNTER — NON-APPOINTMENT (OUTPATIENT)
Age: 78
End: 2023-07-12

## 2023-07-13 ENCOUNTER — NON-APPOINTMENT (OUTPATIENT)
Age: 78
End: 2023-07-13

## 2023-07-13 VITALS — DIASTOLIC BLOOD PRESSURE: 78 MMHG | SYSTOLIC BLOOD PRESSURE: 125 MMHG

## 2023-07-13 VITALS — BODY MASS INDEX: 38.1 KG/M2 | WEIGHT: 258 LBS

## 2023-07-13 NOTE — VITALS
[Least Pain Intensity: 0/10] : 0/10 [Pain Description/Quality: ___] : Pain description/quality: [unfilled] [Pain Duration: ___] : Pain duration: [unfilled] [Pain Location: ___] : Pain Location: [unfilled] [NoTreatment Scheduled] : no treatment scheduled [80: Normal activity with effort; some signs or symptoms of disease.] : 80: Normal activity with effort; some signs or symptoms of disease.  [ECOG Performance Status: 1 - Restricted in physically strenuous activity but ambulatory and able to carry out work of a light or sedentary nature] : Performance Status: 1 - Restricted in physically strenuous activity but ambulatory and able to carry out work of a light or sedentary nature, e.g., light house work, office work [Maximal Pain Intensity: 3/10] : 3/10

## 2023-07-18 RX ORDER — LIDOCAINE HYDROCHLORIDE 20 MG/ML
2 SOLUTION ORAL; TOPICAL 3 TIMES DAILY
Qty: 500 | Refills: 2 | Status: ACTIVE | COMMUNITY
Start: 2023-07-18 | End: 1900-01-01

## 2023-07-18 RX ORDER — GABAPENTIN 300 MG/1
300 CAPSULE ORAL EVERY 8 HOURS
Qty: 90 | Refills: 1 | Status: ACTIVE | COMMUNITY
Start: 2023-07-18 | End: 1900-01-01

## 2023-07-18 NOTE — DISEASE MANAGEMENT
[Clinical] : TNM Stage: c [DOMINIC] : DOMINIC [TTNM] : x [NTNM] : 3b [MTNM] : 0 [de-identified] : 66 Gy [de-identified] : oropharynx

## 2023-07-18 NOTE — DISEASE MANAGEMENT
[Clinical] : TNM Stage: c [DOMINIC] : DOMINIC [TTNM] : x [NTNM] : 3b [MTNM] : 0 [de-identified] : 66 Gy [de-identified] : oropharynx

## 2023-07-18 NOTE — REVIEW OF SYSTEMS
[Dysphagia: Grade 0] : Dysphagia: Grade 0 [Tinnitus - Grade 0] : Tinnitus - Grade 0 [Blurred Vision: Grade 0] : Blurred Vision: Grade 0 [Mucositis Oral: Grade 0] : Mucositis Oral: Grade 0  [Xerostomia: Grade 0] : Xerostomia: Grade 0 [Oral Pain: Grade 0] : Oral Pain: Grade 0 [Salivary duct inflammation: Grade 0] : Salivary duct inflammation: Grade 0 [Dysgeusia: Grade 0] : Dysgeusia: Grade 0 [Cough: Grade 1 - Mild symptoms; nonprescription intervention indicated] : Cough: Grade 1 - Mild symptoms; nonprescription intervention indicated [Alopecia: Grade 0] : Alopecia: Grade 0 [Pruritus: Grade 0] : Pruritus: Grade 0 [Skin Atrophy: Grade 0] : Skin Atrophy: Grade 0 [Skin Hyperpigmentation: Grade 0] : Skin Hyperpigmentation: Grade 0 [Skin Induration: Grade 0] : Skin Induration: Grade 0 [Dermatitis Radiation: Grade 1 - Faint erythema or dry desquamation] : Dermatitis Radiation: Grade 1 - Faint erythema or dry desquamation [Negative] : Allergic/Immunologic [FreeTextEntry4] : left neck mass

## 2023-07-18 NOTE — HISTORY OF PRESENT ILLNESS
[FreeTextEntry1] : Mr. Helio Mcgill is a 79 yo man presenting to discuss RT options for cancer of the head and neck.\par \par HPI:\par Thyroid US incidentally revealed a 2.4cm left level 3 node and 2 cm left level 2a node. 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 Nj 2/9/23 did not show any evidence of disease, recommended EUS with random biopsy of BOT, tonsil, and left neck dissection which was done 2/28/23. Intraoperatively found left level 2 ANTONIO. 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 He denies any new growths in the neck and is eating a normal diet. He denies weight loss. \par \par 4/13/2023 OTV. 8/33 fractions of radiation to oropharynx completed. c/o cough at times. No pain, dysphagia. Pt has not started chemo yet with Dr Pantoja @ [facility] . Pt will consider feeding tube placement when necessary. Pt has lower O2 Sat since COVID infection about 2 years ago. \par \par 6/22/2023 OTV. 14/33 fractions of RT to oropharynx completed. Pt is back for RT from hospitalization for heart condition and pacemaker. \par 7/6/23 OTV. 15/33 fractions of RT to oropharynx completed. Pt is back for RT from hospitalization for heart condition and pacemaker. c/o minor skin infection over pacemaker placement site. \par 7/13/2023 OTV. 21/33 fractions of RT to oropharynx completed. \par \par \par

## 2023-07-18 NOTE — REASON FOR VISIT
[Routine On-Treatment] : a routine on-treatment visit for [Spouse] : spouse [TWNoteComboBox1] : Bruneian

## 2023-07-18 NOTE — HISTORY OF PRESENT ILLNESS
[FreeTextEntry1] : Mr. Helio Mcgill is a 77 yo man presenting to discuss RT options for cancer of the head and neck.\par \par HPI:\par Thyroid US incidentally revealed a 2.4cm left level 3 node and 2 cm left level 2a node. 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 Nj 2/9/23 did not show any evidence of disease, recommended EUS with random biopsy of BOT, tonsil, and left neck dissection which was done 2/28/23. Intraoperatively found left level 2 ANTONIO. 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 He denies any new growths in the neck and is eating a normal diet. He denies weight loss. \par \par 4/13/2023 OTV. 8/33 fractions of radiation to oropharynx completed. c/o cough at times. No pain, dysphagia. Pt has not started chemo yet with Dr Pantoja @ [facility] . Pt will consider feeding tube placement when necessary. Pt has lower O2 Sat since COVID infection about 2 years ago. \par \par 6/22/2023 OTV. 14/33 fractions of RT to oropharynx completed. Pt is back for RT from hospitalization for heart condition and pacemaker. \par 7/6/23 OTV. 15/33 fractions of RT to oropharynx completed. Pt is back for RT from hospitalization for heart condition and pacemaker. c/o minor skin infection over pacemaker placement site.  \par \par 7/13/23: OTV 20/33 fractions of RT to oropharynx completed. Patient notes some intermittent pain in the throat. He states that it hurts when he swallows.\par

## 2023-07-20 ENCOUNTER — NON-APPOINTMENT (OUTPATIENT)
Age: 78
End: 2023-07-20

## 2023-07-20 VITALS — HEIGHT: 69 IN | WEIGHT: 251 LBS | BODY MASS INDEX: 37.18 KG/M2 | RESPIRATION RATE: 18 BRPM

## 2023-07-25 NOTE — HISTORY OF PRESENT ILLNESS
[FreeTextEntry1] : Mr. Helio Mcgill is a 77 yo man presenting to discuss RT options for cancer of the head and neck.\par \par HPI:\par Thyroid US incidentally revealed a 2.4cm left level 3 node and 2 cm left level 2a node. 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 Nj 2/9/23 did not show any evidence of disease, recommended EUS with random biopsy of BOT, tonsil, and left neck dissection which was done 2/28/23. Intraoperatively found left level 2 ANTONIO. 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 He denies any new growths in the neck and is eating a normal diet. He denies weight loss. \par \par 4/13/2023 OTV. 8/33 fractions of radiation to oropharynx completed. c/o cough at times. No pain, dysphagia. Pt has not started chemo yet with Dr Pantoja @ [facility] . Pt will consider feeding tube placement when necessary. Pt has lower O2 Sat since COVID infection about 2 years ago. \par \par 6/22/2023 OTV. 14/33 fractions of RT to oropharynx completed. Pt is back for RT from hospitalization for heart condition and pacemaker. \par 7/6/23 OTV. 15/33 fractions of RT to oropharynx completed. Pt is back for RT from hospitalization for heart condition and pacemaker. c/o minor skin infection over pacemaker placement site.  \par \par 7/20/23: OTV 26/33 fractions of RT to oropharynx completed. Patient notes some intermittent pain in the throat. He states that it hurts when he swallows. c/o dysphagia. Rx gabapentin, mouthwash given. Ensure suppl sample given for nutrition. Rx blood test given to be done in few days.\par

## 2023-07-25 NOTE — REASON FOR VISIT
[[employer] ] : provided by [employer]   [Interpreters_IDNumber] : [id number] [TWNoteComboBox1] : Faroese

## 2023-07-27 ENCOUNTER — NON-APPOINTMENT (OUTPATIENT)
Age: 78
End: 2023-07-27

## 2023-07-27 VITALS — RESPIRATION RATE: 18 BRPM | WEIGHT: 246 LBS | BODY MASS INDEX: 36.43 KG/M2 | HEIGHT: 69 IN

## 2023-07-27 NOTE — REASON FOR VISIT
[Routine On-Treatment] : a routine on-treatment visit for [[employer] ] : provided by [employer]   [Interpreters_IDNumber] : [id number] [TWNoteComboBox1] : Palestinian

## 2023-07-27 NOTE — VITALS
[Maximal Pain Intensity: 3/10] : 3/10 [Least Pain Intensity: 0/10] : 0/10 [Pain Description/Quality: ___] : Pain description/quality: [unfilled] [Pain Duration: ___] : Pain duration: [unfilled] [Pain Location: ___] : Pain Location: [unfilled] [Adjuvant (neuroleptics)] : adjuvant (neuroleptics) [80: Normal activity with effort; some signs or symptoms of disease.] : 80: Normal activity with effort; some signs or symptoms of disease.  [ECOG Performance Status: 1 - Restricted in physically strenuous activity but ambulatory and able to carry out work of a light or sedentary nature] : Performance Status: 1 - Restricted in physically strenuous activity but ambulatory and able to carry out work of a light or sedentary nature, e.g., light house work, office work

## 2023-07-27 NOTE — REVIEW OF SYSTEMS
[Dysphagia: Grade 1 - Symptomatic, able to eat regular diet] : Dysphagia: Grade 1 - Symptomatic, able to eat regular diet [Tinnitus - Grade 0] : Tinnitus - Grade 0 [Blurred Vision: Grade 0] : Blurred Vision: Grade 0 [Mucositis Oral: Grade 0] : Mucositis Oral: Grade 0  [Xerostomia: Grade 1 - Symptomatic (e.g., dry or thick saliva) without significant dietary alteration; unstimulated saliva flow >0.2 ml/min] : Xerostomia: Grade 1 - Symptomatic (e.g., dry or thick saliva) without significant dietary alteration; unstimulated saliva flow >0.2 ml/min [Oral Pain: Grade 1 - Mild pain] : Oral Pain: Grade 1 - Mild pain [Salivary duct inflammation: Grade 1 - Slightly thickened saliva; slightly altered taste (e.g., metallic)] : Salivary duct inflammation: Grade 1 - Slightly thickened saliva; slightly altered taste (e.g., metallic) [Dysgeusia: Grade 0] : Dysgeusia: Grade 0 [Cough: Grade 1 - Mild symptoms; nonprescription intervention indicated] : Cough: Grade 1 - Mild symptoms; nonprescription intervention indicated [Alopecia: Grade 0] : Alopecia: Grade 0 [Pruritus: Grade 0] : Pruritus: Grade 0 [Skin Atrophy: Grade 0] : Skin Atrophy: Grade 0 [Skin Hyperpigmentation: Grade 1 - Hyperpigmentation covering <10% BSA; no psychosocial impact] : Skin Hyperpigmentation: Grade 1 - Hyperpigmentation covering <10% BSA; no psychosocial impact [Skin Induration: Grade 0] : Skin Induration: Grade 0 [Dermatitis Radiation: Grade 1 - Faint erythema or dry desquamation] : Dermatitis Radiation: Grade 1 - Faint erythema or dry desquamation [Negative] : Allergic/Immunologic [FreeTextEntry4] : left neck mass

## 2023-07-27 NOTE — DISEASE MANAGEMENT
[Clinical] : TNM Stage: c [DOMINIC] : DOMINIC [TTNM] : x [NTNM] : 3b [MTNM] : 0 [de-identified] : 66 Gy [de-identified] : oropharynx

## 2023-07-27 NOTE — HISTORY OF PRESENT ILLNESS
[FreeTextEntry1] : Mr. Helio Mcgill is a 77 yo man presenting to discuss RT options for cancer of the head and neck.\par \par HPI:\par Thyroid US incidentally revealed a 2.4cm left level 3 node and 2 cm left level 2a node. 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 Nj 2/9/23 did not show any evidence of disease, recommended EUS with random biopsy of BOT, tonsil, and left neck dissection which was done 2/28/23. Intraoperatively found left level 2 ANTONIO. 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 He denies any new growths in the neck and is eating a normal diet. He denies weight loss. \par \par 4/13/2023 OTV. 8/33 fractions of radiation to oropharynx completed. c/o cough at times. No pain, dysphagia. Pt has not started chemo yet with Dr Pantoja @ [facility] . Pt will consider feeding tube placement when necessary. Pt has lower O2 Sat since COVID infection about 2 years ago. \par \par 6/22/2023 OTV. 14/33 fractions of RT to oropharynx completed. Pt is back for RT from hospitalization for heart condition and pacemaker. \par 7/6/23 OTV. 15/33 fractions of RT to oropharynx completed. Pt is back for RT from hospitalization for heart condition and pacemaker. c/o minor skin infection over pacemaker placement site.  \par \par 7/20/23: OTV 26/33 fractions of RT to oropharynx completed. Patient notes some intermittent pain in the throat. He states that it hurts when he swallows. c/o dysphagia. Encouraged to use gabapentin, mouthwash. On Ensure suppl for nutrition. Rx blood test given last week. Will consider to add few more radiation session. \par

## 2023-08-03 ENCOUNTER — NON-APPOINTMENT (OUTPATIENT)
Age: 78
End: 2023-08-03

## 2023-08-03 NOTE — REVIEW OF SYSTEMS
[Dysphagia: Grade 1 - Symptomatic, able to eat regular diet] : Dysphagia: Grade 1 - Symptomatic, able to eat regular diet [Tinnitus - Grade 0] : Tinnitus - Grade 0 [Blurred Vision: Grade 0] : Blurred Vision: Grade 0 [Mucositis Oral: Grade 0] : Mucositis Oral: Grade 0  [Xerostomia: Grade 1 - Symptomatic (e.g., dry or thick saliva) without significant dietary alteration; unstimulated saliva flow >0.2 ml/min] : Xerostomia: Grade 1 - Symptomatic (e.g., dry or thick saliva) without significant dietary alteration; unstimulated saliva flow >0.2 ml/min [Oral Pain: Grade 1 - Mild pain] : Oral Pain: Grade 1 - Mild pain [Salivary duct inflammation: Grade 1 - Slightly thickened saliva; slightly altered taste (e.g., metallic)] : Salivary duct inflammation: Grade 1 - Slightly thickened saliva; slightly altered taste (e.g., metallic) [Dysgeusia: Grade 0] : Dysgeusia: Grade 0 [Cough: Grade 1 - Mild symptoms; nonprescription intervention indicated] : Cough: Grade 1 - Mild symptoms; nonprescription intervention indicated [Alopecia: Grade 0] : Alopecia: Grade 0 [Pruritus: Grade 0] : Pruritus: Grade 0 [Skin Atrophy: Grade 0] : Skin Atrophy: Grade 0 [Skin Hyperpigmentation: Grade 1 - Hyperpigmentation covering <10% BSA; no psychosocial impact] : Skin Hyperpigmentation: Grade 1 - Hyperpigmentation covering <10% BSA; no psychosocial impact [Skin Induration: Grade 0] : Skin Induration: Grade 0 [Negative] : Allergic/Immunologic [Voice Alteration: Grade 1 - Mild or intermittent change from normal voice] : Voice Alteration: Grade 1 - Mild or intermittent change from normal voice [Dermatitis Radiation: Grade 2 - Moderate to brisk erythema; patchy moist desquamation, mostly confined to skin folds and creases; moderate edema] : Dermatitis Radiation: Grade 2 - Moderate to brisk erythema; patchy moist desquamation, mostly confined to skin folds and creases; moderate edema [FreeTextEntry4] : left neck mass

## 2023-08-03 NOTE — HISTORY OF PRESENT ILLNESS
[FreeTextEntry1] : Mr. Helio Mcgill is a 77 yo man presenting to discuss RT options for cancer of the head and neck.  HPI: Thyroid US incidentally revealed a 2.4cm left level 3 node and 2 cm left level 2a node.  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 Nj 2/9/23 did not show any evidence of disease, recommended EUS with random biopsy of BOT, tonsil, and left neck dissection which was done 2/28/23. Intraoperatively found left level 2 ANTONIO. 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 He denies any new growths in the neck and is eating a normal diet. He denies weight loss.   4/13/2023 OTV. 8/33 fractions of radiation to oropharynx completed. c/o cough at times. No pain, dysphagia. Pt has not started chemo yet with Dr Pantoja @ [facility] . Pt will consider feeding tube placement when necessary. Pt has lower O2 Sat since COVID infection about 2 years ago.   6/22/2023 OTV. 14/33 fractions of RT to oropharynx completed. Pt is back for RT from hospitalization for heart condition and pacemaker.  7/6/23 OTV. 15/33 fractions of RT to oropharynx completed. Pt is back for RT from hospitalization for heart condition and pacemaker. c/o minor skin infection over pacemaker placement site.    8/3/23: OTV 36/36 fractions of RT to oropharynx completed. Patient notes some intermittent pain in the throat. He states that it hurts when he swallows. c/o dysphagia. Encouraged to use gabapentin, mouthwash. On Ensure suppl for nutrition. Recent Blood test okay. Pt declined nutrition service advice.

## 2023-08-03 NOTE — DISEASE MANAGEMENT
[Clinical] : TNM Stage: c [DOMINIC] : DOMINIC [TTNM] : x [NTNM] : 3b [MTNM] : 0 [de-identified] : 72 Gy [de-identified] : oropharynx

## 2023-08-03 NOTE — REASON FOR VISIT
[Routine On-Treatment] : a routine on-treatment visit for [[employer] ] : provided by [employer]   [Interpreters_IDNumber] : [id number] [TWNoteComboBox1] : [place]

## 2023-08-09 ENCOUNTER — APPOINTMENT (OUTPATIENT)
Dept: ELECTROPHYSIOLOGY | Facility: CLINIC | Age: 78
End: 2023-08-09
Payer: MEDICARE

## 2023-08-09 VITALS
BODY MASS INDEX: 35.84 KG/M2 | DIASTOLIC BLOOD PRESSURE: 60 MMHG | HEART RATE: 77 BPM | WEIGHT: 242 LBS | HEIGHT: 69 IN | SYSTOLIC BLOOD PRESSURE: 120 MMHG

## 2023-08-09 PROCEDURE — 93000 ELECTROCARDIOGRAM COMPLETE: CPT | Mod: 59

## 2023-08-09 PROCEDURE — 99024 POSTOP FOLLOW-UP VISIT: CPT

## 2023-08-09 PROCEDURE — 93279 PRGRMG DEV EVAL PM/LDLS PM: CPT

## 2023-08-09 RX ORDER — CEPHALEXIN 500 MG/1
500 CAPSULE ORAL
Qty: 10 | Refills: 0 | Status: DISCONTINUED | COMMUNITY
Start: 2023-07-03 | End: 2023-08-09

## 2023-08-09 NOTE — ASSESSMENT
[FreeTextEntry1] : ## Tachy-ej syndrome s/p DC-PPM (Bio, 23, Non-dep) s/p removal and Micra AV2 (MDT, 23, Non-dep) ## Persistent Atrial Fibrillation  ## Metastatic SCC ## Cardiotoxic Drug Monitoring  - - On Eliquis. Compliant. No bleeding issues. Patient to contact us if there is any bleeding issues, interruption or any issues with OAC. Patient to go to ER/call 911 if any major bleeding.  - PPM interrogation shows normally functioning Micra AV2. Battery life ok.  - Remains in AF. Continue Metoprolol. Will DC Amiodarone as he remains in AF. - Further management with antiarrhythmic/cardioversion versus ablation depending upon his response to radiation therapy.  - We discussed multiple therapeutic options for the treatment of atrial fibrillation, including undergoing an atrial fibrillation/left atrial antral isolation ablation. The details of the procedure and risks associated with undergoing an atrial fibrillation/MIMI ablation were discussed in detail including, but not limited to, death, myocardial ischemia, stroke, cardiac perforation, pulmonary vein stenosis, diaphragmatic paralysis via phrenic nerve injury, catheter entrapment in the mitral valve or other location, bleeding, infection, deep vein thrombosis, vascular injury, and worsening atrial arrhythmias. We also discussed that there is a low risk of possible esophageal ulceration, atrial esophageal fistula, atrial bronchial fistula, or another complication requiring the need for major surgery to address.  We also discussed that recurrent atrial fibrillation in the first 2-3 months post procedure is a part of the healing process and has no impact on the overall longer- term success of the ablation. To try to reduce the incidence of these events, the plan will be for antiarrhythmic therapy to be restarted post procedure and continued for the first 3 months after ablation.  - He is undergoing scan in the next couple of months and will decide at that time.  - Remote Monitoring  - Return in 3 months.

## 2023-08-09 NOTE — ADDENDUM
[FreeTextEntry1] : Anisha CYR assisted in documentation on 08/09/2023 acting as a scribe for Dr. Jackie Recio.

## 2023-08-09 NOTE — PHYSICAL EXAM
[General Appearance - Well Developed] : well developed [Normal Appearance] : normal appearance [Well Groomed] : well groomed [General Appearance - Well Nourished] : well nourished [No Deformities] : no deformities [General Appearance - In No Acute Distress] : no acute distress [Irregularly Irregular] : the rhythm was irregularly irregular [Left Infraclavicular] : left infraclavicular area [Clean] : clean [Dry] : dry [Healing Well] : healing well [Serous Drainage] : serous drainage [Bleeding] : no active bleeding [Foul Odor] : no foul smell [Purulent Drainage] : no purulent drainage [Serosanguineous Drainage] : no serosanquineous drainage [Erythema] : not erythematous [Warm] : not warm [Tender] : not tender [Indurated] : not indurated [Fluctuant] : not fluctuant

## 2023-08-09 NOTE — HISTORY OF PRESENT ILLNESS
[de-identified] : Mr. DAVIS is a 78 year-year old male with history of non-obs CAD ([facility], 06/22), HTN, DL, GALINA, PVC, persistent atrial fibrillation, Tachy-ej syndrome s/p DC-PPM (Biotronik, 23, Non-dep), COPD, Ex-smoker, Metastatic keratinizing SCC s/p Sx + RT is sent here for management of device.  Feels fine.  RT is limited due to presence of device and being asked to move by Rad-onc expeditely. RT is being planned in both necks and PPM sites on pectoral region.  7/5/23: Feels fine. s/p pacemaker removal and Micra implant. There was a concern about drainage from wound by VNS. 08/09/2023: Feels fine. S/p radiation treatment. Complains of burning in the back of the neck area where the radiation was done. States that radiation is done. Having follow-up scan in a couple of months.   He is accompanied by his wife.  Lives in [place]. His Daughter is a pathologist in [place].  EKG (08/09/2023): AF @ 77 EKG (07/5/23): AF@76 EKG (6/22/23): AF-

## 2023-08-09 NOTE — PROCEDURE
[No] : not [Atrial Fibrillation] : atrial fibrillation [See Scanned Paceart Report] : See scanned paceart report [See Device Printout] : See device printout [Pacemaker] : pacemaker [DDD] : DDD [Longevity: ___ months] : The estimated remaining battery life is [unfilled] months [Threshold Testing Performed] : Threshold testing was performed [Ventricular] : Ventricular [Lead Imp:  ___ohms] : lead impedance was [unfilled] ohms [Sensing Amplitude ___mv] : sensing amplitude was [unfilled] mv [___V @] : [unfilled] V [___ ms] : [unfilled] ms [de-identified] : KATHARINE [de-identified] : [id number] [de-identified] : [id number] [de-identified] : 6/26/23 [de-identified] : VDD/VVI+ [de-identified] : AVConduction (aka VVI+ mode): 66% AM- 9.6%  only 4.4%

## 2023-09-13 ENCOUNTER — APPOINTMENT (OUTPATIENT)
Dept: RADIATION ONCOLOGY | Facility: CLINIC | Age: 78
End: 2023-09-13
Payer: MEDICARE

## 2023-09-13 PROCEDURE — 99024 POSTOP FOLLOW-UP VISIT: CPT

## 2023-11-09 ENCOUNTER — APPOINTMENT (OUTPATIENT)
Dept: CARDIOLOGY | Facility: CLINIC | Age: 78
End: 2023-11-09

## 2023-12-06 ENCOUNTER — APPOINTMENT (OUTPATIENT)
Dept: ELECTROPHYSIOLOGY | Facility: CLINIC | Age: 78
End: 2023-12-06
Payer: MEDICARE

## 2023-12-06 VITALS
HEIGHT: 69 IN | HEART RATE: 73 BPM | BODY MASS INDEX: 35.84 KG/M2 | DIASTOLIC BLOOD PRESSURE: 84 MMHG | WEIGHT: 242 LBS | SYSTOLIC BLOOD PRESSURE: 126 MMHG

## 2023-12-06 PROCEDURE — 99214 OFFICE O/P EST MOD 30 MIN: CPT | Mod: 25

## 2023-12-06 PROCEDURE — 93000 ELECTROCARDIOGRAM COMPLETE: CPT | Mod: 59

## 2023-12-06 PROCEDURE — 93279 PRGRMG DEV EVAL PM/LDLS PM: CPT

## 2023-12-13 ENCOUNTER — APPOINTMENT (OUTPATIENT)
Dept: CARDIOLOGY | Facility: CLINIC | Age: 78
End: 2023-12-13

## 2023-12-22 ENCOUNTER — APPOINTMENT (OUTPATIENT)
Dept: RADIATION ONCOLOGY | Facility: CLINIC | Age: 78
End: 2023-12-22
Payer: MEDICARE

## 2023-12-22 VITALS — WEIGHT: 245 LBS | HEIGHT: 69 IN | RESPIRATION RATE: 16 BRPM | BODY MASS INDEX: 36.29 KG/M2

## 2023-12-22 PROCEDURE — 99214 OFFICE O/P EST MOD 30 MIN: CPT

## 2023-12-22 NOTE — VITALS
[Maximal Pain Intensity: 0/10] : 0/10 [Least Pain Intensity: 0/10] : 0/10 [Adjuvant (neuroleptics)] : adjuvant (neuroleptics) [80: Normal activity with effort; some signs or symptoms of disease.] : 80: Normal activity with effort; some signs or symptoms of disease.  [ECOG Performance Status: 1 - Restricted in physically strenuous activity but ambulatory and able to carry out work of a light or sedentary nature] : Performance Status: 1 - Restricted in physically strenuous activity but ambulatory and able to carry out work of a light or sedentary nature, e.g., light house work, office work

## 2023-12-25 NOTE — REASON FOR VISIT
[Routine Follow-Up] : routine follow-up visit for [[employer] ] : provided by [employer]   [Interpreters_IDNumber] : [id number] [TWNoteComboBox1] : [place]

## 2023-12-25 NOTE — REVIEW OF SYSTEMS
[Negative] : Allergic/Immunologic [Dysphagia: Grade 0] : Dysphagia: Grade 0 [Tinnitus - Grade 0] : Tinnitus - Grade 0 [Blurred Vision: Grade 0] : Blurred Vision: Grade 0 [Mucositis Oral: Grade 0] : Mucositis Oral: Grade 0  [Xerostomia: Grade 1 - Symptomatic (e.g., dry or thick saliva) without significant dietary alteration; unstimulated saliva flow >0.2 ml/min] : Xerostomia: Grade 1 - Symptomatic (e.g., dry or thick saliva) without significant dietary alteration; unstimulated saliva flow >0.2 ml/min [Oral Pain: Grade 0] : Oral Pain: Grade 0 [Salivary duct inflammation: Grade 0] : Salivary duct inflammation: Grade 0 [Dysgeusia: Grade 0] : Dysgeusia: Grade 0 [Cough: Grade 1 - Mild symptoms; nonprescription intervention indicated] : Cough: Grade 1 - Mild symptoms; nonprescription intervention indicated [Voice Alteration: Grade 1 - Mild or intermittent change from normal voice] : Voice Alteration: Grade 1 - Mild or intermittent change from normal voice [Alopecia: Grade 0] : Alopecia: Grade 0 [Pruritus: Grade 0] : Pruritus: Grade 0 [Skin Atrophy: Grade 0] : Skin Atrophy: Grade 0 [Skin Hyperpigmentation: Grade 0] : Skin Hyperpigmentation: Grade 0 [Skin Induration: Grade 0] : Skin Induration: Grade 0 [Dermatitis Radiation: Grade 0] : Dermatitis Radiation: Grade 0 [FreeTextEntry4] : left neck mass

## 2023-12-25 NOTE — HISTORY OF PRESENT ILLNESS
[FreeTextEntry1] : Mr. Helio Mcgill is a 77 yo man with h/o pacemaker implantation, radiation for oropharyngeal cancer in 8/2023 presents for F/U.  HPI: Thyroid US incidentally revealed a 2.4cm left level 3 node and 2 cm left level 2a node.  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 Nj 2/9/23 did not show any evidence of disease, recommended EUS with random biopsy of BOT, tonsil, and left neck dissection which was done 2/28/23. Intraoperatively found left level 2 ANTONIO. 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  3/9/23 Consult, in clinic he reports that he is healing well from surgery with no drainage or bleeding from the surgical site and only minor tenderness to palpation. He denies any new growths in the neck and is eating a normal diet. He denies weight loss.   4/13/2023 OTV. 8/33 fractions of radiation to oropharynx completed. c/o cough at times. No pain, dysphagia. Pt has not started chemo yet with Dr Pantoja @ [facility] . Pt will consider feeding tube placement when necessary. Pt has lower O2 Sat since COVID infection about 2 years ago.   6/22/2023 OTV. 14/33 fractions of RT to oropharynx completed. Pt is back for RT from hospitalization for heart condition and pacemaker.  7/6/23 OTV. 15/33 fractions of RT to oropharynx completed. Pt is back for RT from hospitalization for heart condition and pacemaker. c/o minor skin infection over pacemaker placement site.    8/3/23: OTV 36/36 fractions of RT to oropharynx completed. Patient notes some intermittent pain in the throat. He states that it hurts when he swallows. c/o dysphagia. Encouraged to use gabapentin, mouthwash. On Ensure suppl for nutrition. Recent Blood test okay. Pt declined nutrition service advice.  11/2023 -- PET scan showed good response to radiation treatment. ENT Dr Fonseca @ [facility].   12/22/23 F/U. Pt completed 72 Gy/ 36 Fx of RT oropharynx in 8/2023. c/o burning sensation of tongue to some foods. No dysphagia. Taste has 90% recovery.  Advised to continue f/u with ENT service.

## 2023-12-25 NOTE — DISEASE MANAGEMENT
[Clinical] : TNM Stage: c [DOMINIC] : DOMINIC [NTNM] : 3b [TTNM] : x [MTNM] : 0 [de-identified] : 72 Gy [de-identified] : oropharynx

## 2023-12-28 NOTE — HISTORY OF PRESENT ILLNESS
[de-identified] : Mr. DAVIS is a 78 year-year old male with history of non-obs CAD ([facility], 06/22), HTN, DL, GALINA, PVC, persistent atrial fibrillation, Tachy-ej syndrome s/p DC-PPM (Biotronik, 23, Non-dep), COPD, Ex-smoker, Metastatic keratinizing SCC s/p Sx + RT is sent here for management of device.  Feels fine.  RT is limited due to presence of device and being asked to move by Rad-onc expeditely. RT is being planned in both necks and PPM sites on pectoral region.  He is accompanied by his wife.  Lives in [place]. His Daughter is a pathologist in [place].   7/5/23: Feels fine. s/p pacemaker removal and Micra implant. There was a concern about drainage from wound by VNS. 08/09/2023: Feels fine. S/p radiation treatment. Complains of burning in the back of the neck area where the radiation was done. States that radiation is done. Having follow-up scan in a couple of months.  12/06/2023: Feels fine. S/p complete radiation treatment. Cleared by ENT as per the patient. Feels mild PARMAR.    Denies chest pain, shortness of breath, palpitation, dizziness or LOC except noted above.   EKG (12/06/2023): AF   EKG (08/09/2023): AF @ 77 EKG (07/5/23): AF@76 EKG (6/22/23): AF-

## 2023-12-28 NOTE — ASSESSMENT
[FreeTextEntry1] : ## Tachy-ej syndrome s/p DC-PPM (Bio, 23, Non-dep) s/p removal and Micra AV2 (MDT, 23, Non-dep) ## Persistent Atrial Fibrillation ## Metastatic SCC ## Cardiotoxic Drug Monitoring  - On Eliquis. Compliant. No bleeding issues. Patient to contact us if there is any bleeding issues, interruption or any issues with OAC. Patient to go to ER/call 911 if any major bleeding. - PPM interrogation shows normally functioning Micra AV2. Battery life ok. - Remains in AF. Continue Metoprolol. Off Amiodarone.  - Further management with antiarrhythmic/cardioversion versus ablation depending upon his response to radiation therapy. - We discussed multiple therapeutic options for the treatment of atrial fibrillation, including undergoing an atrial fibrillation/left atrial antral isolation ablation. The details of the procedure and risks associated with undergoing an atrial fibrillation/MIMI ablation were discussed in detail including, but not limited to, death, myocardial ischemia, stroke, cardiac perforation, pulmonary vein stenosis, diaphragmatic paralysis via phrenic nerve injury, catheter entrapment in the mitral valve or other location, bleeding, infection, deep vein thrombosis, vascular injury, and worsening atrial arrhythmias. We also discussed that there is a low risk of possible esophageal ulceration, atrial esophageal fistula, atrial bronchial fistula, or another complication requiring the need for major surgery to address.  We also discussed that recurrent atrial fibrillation in the first 2-3 months post procedure is a part of the healing process and has no impact on the overall longer- term success of the ablation. To try to reduce the incidence of these events, the plan will be for antiarrhythmic therapy to be restarted post procedure and continued for the first 3 months after ablation. - He wants to think about it.  - Remote Monitoring - RTC in 6 months.

## 2023-12-28 NOTE — PHYSICAL EXAM
When Outside In The Sun, Do You...: mostly burns, rarely tans [General Appearance - Well Developed] : well developed [Normal Appearance] : normal appearance [Well Groomed] : well groomed [General Appearance - Well Nourished] : well nourished [No Deformities] : no deformities [General Appearance - In No Acute Distress] : no acute distress [Irregularly Irregular] : the rhythm was irregularly irregular [Left Infraclavicular] : left infraclavicular area [Clean] : clean [Dry] : dry [Healing Well] : healing well [Bleeding] : no active bleeding [Foul Odor] : no foul smell [Purulent Drainage] : no purulent drainage [Serosanguineous Drainage] : no serosanquineous drainage [Serous Drainage] : serous drainage [Erythema] : not erythematous [Warm] : not warm [Tender] : not tender [Indurated] : not indurated [Fluctuant] : not fluctuant

## 2023-12-28 NOTE — PROCEDURE
[de-identified] : KATHARINE [de-identified] : [id number] [de-identified] : [id number] [de-identified] : 6/26/23 [de-identified] : VDD/VVI+ [de-identified] : AVConduction (aka VVI+ mode): 57.5% AM- 13.4%  only 8.5%

## 2023-12-28 NOTE — ADDENDUM
[FreeTextEntry1] : Anisha CYR assisted in documentation on 12/28/2023 acting as a scribe for Dr. Jackie Recio.

## 2024-01-18 ENCOUNTER — APPOINTMENT (OUTPATIENT)
Dept: CARDIOLOGY | Facility: CLINIC | Age: 79
End: 2024-01-18

## 2024-02-21 ENCOUNTER — APPOINTMENT (OUTPATIENT)
Dept: CARDIOLOGY | Facility: CLINIC | Age: 79
End: 2024-02-21

## 2024-03-14 ENCOUNTER — NON-APPOINTMENT (OUTPATIENT)
Age: 79
End: 2024-03-14

## 2024-03-14 ENCOUNTER — APPOINTMENT (OUTPATIENT)
Dept: CARDIOLOGY | Facility: CLINIC | Age: 79
End: 2024-03-14
Payer: MEDICARE

## 2024-03-14 PROCEDURE — 93294 REM INTERROG EVL PM/LDLS PM: CPT

## 2024-03-14 PROCEDURE — 93296 REM INTERROG EVL PM/IDS: CPT

## 2024-03-27 NOTE — PATIENT PROFILE ADULT - SAFE PLACE TO LIVE
Encounter Date: 3/26/2024       History     Chief Complaint   Patient presents with    Tachycardia     Said she was told to come to ER for elevated heart rate after visiting primary care     Chief complaint abnormal labs HPI this is an 89-year-old female who was sent to the emergency room for abnormal labs.  The patient states that she saw a nurse practitioner yesterday to establish herself with a primary care provider.  She states that she had lab done at that time.  She was called today and told to come to the emergency room as her labs were abnormal.  She is not certain which lab was abnormal.  She does report that she has been somewhat more short of breath than normal.  She has ongoing chronic orthopnea and has been propping herself up somewhat more recently.  She also reports that her daughter has noted that she has been winded at times.  She has bilateral lower extremity edema as well.  Denies chest pain.  Denies fever.  States she has been urinating normally.  She reports she has not on any diuretics.  She denies any other problems or complaints.        Review of patient's allergies indicates:   Allergen Reactions    Fenofibric acid (choline)      Other reaction(s): Vomiting    Iodine      Other reaction(s): lips swollen ivp dye    Rosuvastatin      Other reaction(s): muscle pain     Past Medical History:   Diagnosis Date    Acute decompensated heart failure 11/16/2023    Anemia due to stage 3 chronic kidney disease 07/24/2019    Arthritis     Bullous pemphigoid 8/29/2022    Chronic bilateral low back pain without sciatica 07/24/2019    CKD (chronic kidney disease) stage 3, GFR 30-59 ml/min 07/24/2019    Colon polyps     Coronary artery disease     Diabetes mellitus type II     Diabetes with neurologic complications     Drug-induced immunodeficiency 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    GERD (gastroesophageal reflux disease)     Hyperlipidemia     Hypertension     Hypothyroidism     Paroxysmal atrial fibrillation 3/16/2023    Type 2  diabetes mellitus      Past Surgical History:   Procedure Laterality Date    ADENOIDECTOMY      AORTIC VALVE REPLACEMENT      BREAST BIOPSY Right 20 yrs ago    benign    CARDIAC SURGERY      CHOLECYSTECTOMY      COLONOSCOPY  2014    Dr Holly, 5 year recheck    EPIDURAL STEROID INJECTION N/A 3/25/2021    Procedure: Injection, Steroid, Epidural L3-4;  Surgeon: Sky Love MD;  Location: [facility] OR;  Service: Pain Management;  Laterality: N/A;  Injection, Steroid, Epidural L3-4    EPIDURAL STEROID INJECTION N/A 2021    Procedure: Injection, Steroid, Epidural L3-4;  Surgeon: Sky Love MD;  Location: [facility] OR;  Service: Pain Management;  Laterality: N/A;  Injection, Steroid, Epidural L3-4    ESOPHAGOGASTRODUODENOSCOPY N/A 2020    Procedure: EGD (ESOPHAGOGASTRODUODENOSCOPY);  Surgeon: Michael Nugent III, MD;  Location: [facility];  Service: Endoscopy;  Laterality: N/A;    HEMORRHOID SURGERY      HYSTERECTOMY      OOPHORECTOMY      TONSILLECTOMY       Family History   Problem Relation Age of Onset    Diabetes Mother     Heart disease Mother     Glaucoma Mother     Cancer Father         lung cancer    Early death Son         brain tumor age 3    Diabetes Brother     No Known Problems Daughter     Early death Son         MVA 25    Macular degeneration Neg Hx     Retinal detachment Neg Hx      Social History     Tobacco Use    Smoking status: Former     Current packs/day: 0.00     Average packs/day: 0.3 packs/day for 15.0 years (3.8 ttl pk-yrs)     Types: Cigarettes     Start date: 3/30/1959     Quit date: 3/30/1974     Years since quittin.0    Smokeless tobacco: Never   Substance Use Topics    Alcohol use: No    Drug use: No     Review of Systems   Constitutional: Negative.  Negative for activity change, appetite change, chills, fatigue and fever.   HENT: Negative.  Negative for congestion, rhinorrhea and sore throat.    Eyes: Negative.  Negative for photophobia, pain and visual disturbance.    Respiratory:  Positive for shortness of breath (denies current shortness breath.  Patient unable to quantitate time.). Negative for cough, chest tightness and wheezing.    Cardiovascular:  Positive for leg swelling. Negative for chest pain and palpitations.   Gastrointestinal: Negative.  Negative for abdominal distention, abdominal pain, blood in stool, diarrhea, nausea and vomiting.   Endocrine: Negative.    Genitourinary: Negative.  Negative for decreased urine volume, difficulty urinating, dysuria, flank pain, frequency, pelvic pain and urgency.   Musculoskeletal: Negative.  Negative for arthralgias, back pain, gait problem, myalgias, neck pain and neck stiffness.   Skin: Negative.  Negative for rash.   Neurological: Negative.  Negative for dizziness, syncope, facial asymmetry, speech difficulty, weakness, light-headedness, numbness and headaches.   Hematological:  Does not bruise/bleed easily.   Psychiatric/Behavioral: Negative.  Negative for confusion.    All other systems reviewed and are negative.      Physical Exam     Initial Vitals [03/26/24 1504]   BP Pulse Resp Temp SpO2   112/86 84 18 98.1 °F (36.7 °C) 95 %      MAP       --         Physical Exam    Nursing note and vitals reviewed.  Constitutional: She is not diaphoretic. She is active and cooperative.  Non-toxic appearance. She does not have a sickly appearance. She does not appear ill. No distress.   HENT:   Head: Normocephalic and atraumatic.   Right Ear: Tympanic membrane normal.   Left Ear: Tympanic membrane normal.   Nose: Nose normal.   Mouth/Throat: Uvula is midline, oropharynx is clear and moist and mucous membranes are normal. No oral lesions. No uvula swelling. No oropharyngeal exudate, posterior oropharyngeal edema or posterior oropharyngeal erythema.   Eyes: Conjunctivae, EOM and lids are normal. Pupils are equal, round, and reactive to light. No scleral icterus.   Neck: Trachea normal and phonation normal. Neck supple. No thyroid mass  and no thyromegaly present. No stridor present. No JVD present.   Normal range of motion.   Full passive range of motion without pain.     Cardiovascular:  Normal rate, regular rhythm, normal heart sounds, intact distal pulses and normal pulses.     Exam reveals no gallop, no distant heart sounds, no friction rub and no decreased pulses.       No murmur heard.  Pulmonary/Chest: Effort normal. No accessory muscle usage or stridor. No tachypnea. No respiratory distress. She has no wheezes. She has no rhonchi. She has rales.   Fine rales noted at bases bilaterally.   Abdominal: Abdomen is soft. Bowel sounds are normal. She exhibits no distension, no pulsatile midline mass and no mass. There is no abdominal tenderness. There is no rigidity and no guarding.   Musculoskeletal:         General: No tenderness. Normal range of motion.      Right hand: Normal. Normal range of motion. Normal strength. Normal sensation. Normal capillary refill. Normal pulse.      Left hand: Normal. Normal range of motion. Normal strength. Normal sensation. Normal capillary refill. Normal pulse.      Cervical back: Normal, full passive range of motion without pain, normal range of motion and neck supple. No edema, erythema, rigidity or bony tenderness. No pain with movement, spinous process tenderness or muscular tenderness. Normal range of motion.      Thoracic back: Normal. No bony tenderness. Normal range of motion.      Lumbar back: Normal. No bony tenderness. Normal range of motion.      Right lower leg: No tenderness. 4+ Edema present.      Left lower leg: No tenderness.      Right ankle: Swelling present.      Left ankle: Swelling present.      Right foot: Normal. Normal range of motion and normal capillary refill. No tenderness or bony tenderness. Normal pulse.      Left foot: Normal. Normal range of motion and normal capillary refill. No tenderness or bony tenderness. Normal pulse.      Comments: Pulses are 2+ throughout, cap refill is  less than 2 sec throughout, extremities are nontender throughout with full range of motion. There is no spinal tenderness to palpation.     Neurological: She is alert and oriented to person, place, and time. She has normal strength. She displays normal reflexes. No cranial nerve deficit or sensory deficit. Gait normal.   No focal deficits.   Skin: Skin is warm, dry and intact. Capillary refill takes less than 2 seconds. No ecchymosis, no petechiae and no rash noted. No erythema. No pallor.   Psychiatric: She has a normal mood and affect. Her speech is normal and behavior is normal. Judgment and thought content normal. Cognition and memory are normal.         ED Course   Procedures  Labs Reviewed   CBC W/ AUTO DIFFERENTIAL - Abnormal; Notable for the following components:       Result Value    RBC 3.84 (*)     Hemoglobin 11.5 (*)     MCHC 31.0 (*)     RDW 15.8 (*)     Lymph # 0.6 (*)     Gran % 78.3 (*)     Lymph % 9.8 (*)     All other components within normal limits   COMPREHENSIVE METABOLIC PANEL - Abnormal; Notable for the following components:    Glucose 169 (*)     BUN 29 (*)     Total Bilirubin 2.3 (*)     eGFR 39.3 (*)     All other components within normal limits   B-TYPE NATRIURETIC PEPTIDE - Abnormal; Notable for the following components:     (*)     All other components within normal limits   URINALYSIS, REFLEX TO URINE CULTURE - Abnormal; Notable for the following components:    Protein, UA 1+ (*)     Glucose, UA 4+ (*)     All other components within normal limits    Narrative:     Specimen Source->Urine   URINALYSIS MICROSCOPIC - Abnormal; Notable for the following components:    WBC, UA 9 (*)     All other components within normal limits    Narrative:     Specimen Source->Urine   COMPREHENSIVE METABOLIC PANEL - Abnormal; Notable for the following components:    Potassium 3.4 (*)     CO2 30 (*)     Glucose 115 (*)     BUN 26 (*)     Total Bilirubin 2.5 (*)     eGFR 39.3 (*)     All other  components within normal limits   CBC W/ AUTO DIFFERENTIAL - Abnormal; Notable for the following components:    RBC 3.96 (*)     Hemoglobin 11.5 (*)     MCHC 30.3 (*)     RDW 15.5 (*)     Lymph # 0.6 (*)     Lymph % 13.3 (*)     All other components within normal limits   POCT GLUCOSE - Abnormal; Notable for the following components:    POC Glucose 123 (*)     All other components within normal limits   POCT GLUCOSE - Abnormal; Notable for the following components:    POC Glucose 119 (*)     All other components within normal limits   POCT GLUCOSE - Abnormal; Notable for the following components:    POC Glucose 202 (*)     All other components within normal limits   TROPONIN I HIGH SENSITIVITY   MAGNESIUM   TSH   LIPASE   CK   TSH   TROPONIN I HIGH SENSITIVITY   MAGNESIUM        ECG Results              EKG 12-lead (In process)        Collection Time Result Time QRS Duration OHS QTC Calculation    03/26/24 15:11:42 03/26/24 15:21:20 106 467                     In process by Interface, Lab In [facility] (03/26/24 15:21:24)                   Narrative:    Test Reason : R06.02,    Vent. Rate : 083 BPM     Atrial Rate : 078 BPM     P-R Int : 000 ms          QRS Dur : 106 ms      QT Int : 398 ms       P-R-T Axes : 000 098 038 degrees     QTc Int : 467 ms    Atrial fibrillation  Rightward axis  Low voltage QRS  Septal infarct (cited on or before 03-JAN-2023)  Abnormal ECG  When compared with ECG of 25-MAR-2024 15:10,  No significant change was found    Referred By:             Confirmed By:                                      EKG 12-LEAD (Final result)  Result time 03/27/24 10:08:08      Final result by Unknown User (03/27/24 10:08:08)                                      Imaging Results              US Lower Extremity Veins Bilateral (Final result)  Result time 03/26/24 18:43:59      Final result by Juan Carlos Sigala MD (03/26/24 18:43:59)                   Narrative:    History: Bilateral lower extremity  swelling.    FINDINGS: Bilateral lower extremity venous duplex exam was performed by the sonographer. Static images are submitted. Common femoral vein, superficial femoral vein, and popliteal vein demonstrate color flow and compressibility bilaterally. Augmentation is noted throughout both lower extremity venous. Calf veins where visualized demonstrate color flow and compressibility. Right popliteal fossa Baker's cyst is visualized measuring 3.5 x 1.9 x 0.7 cm in size.    IMPRESSION:  1. No evidence of lower extremity DVT.  2. Right popliteal fossa Baker's cyst.    Electronically signed by:  Juan Carlos Sigala MD  03/26/2024 06:43 PM CDT Workstation: [id number]                                     X-Ray Chest PA And Lateral (Final result)  Result time 03/26/24 16:19:48   Procedure changed from X-Ray Chest AP Portable     Final result by Narayan Anderson MD (03/26/24 16:19:48)                   Narrative:    Exam: Two-view chest    INDICATION:CHF    COMPARISON:January 22, 2024    FINDINGS:    There is been prior median sternotomy. Heart is enlarged with left ventricular prominence. Lungs are well-expanded and clear. Pulmonary vascularity is normal. Trachea is midline. No pleural fluid is identified. Osseous structures are osteoporotic. Note is made of prior cholecystectomy.    IMPRESSION:    Stable cardiomegaly.    Electronically signed by:  Narayan Anderson MD  03/26/2024 04:19 PM CDT Workstation: [id number]                                     Medications   furosemide injection 40 mg (40 mg Intravenous Given 3/26/24 1936)     Medical Decision Making  Patient is rather a poor historian.  Was called to come to the emergency room after labs done yesterday with primary care with whom she is establishing herself.  Patient endorsed shortness of breath then stated she was not short of breath.  Fine rales at bases noted.  Pulse ox 97% on room air.  Some tachypnea noted as patient is moving around although no overt respiratory  distress.  4+ edema noted to legs bilaterally.  Chart review demonstrates patient had a BNP in the 700s with her physician yesterday.  BNP today is currently elevated however not as high as lab value done yesterday.  Clinic we correlating patient is demonstrating evidence of mild volume overload.  She has not currently on a diuretic.  Diuresis initiated in the emergency room.  Will discuss with hospitalist for admission.    Amount and/or Complexity of Data Reviewed  Labs: ordered.    Risk  Prescription drug management.                                      Clinical Impression:  Final diagnoses:  [R06.02] Shortness of breath  [R60.0] Leg edema  [I50.9] Acute on chronic congestive heart failure, unspecified heart failure type (Primary)          ED Disposition Condition    Observation                 Anette Urrutia MD  03/28/24 1455     no

## 2024-05-23 ENCOUNTER — APPOINTMENT (OUTPATIENT)
Dept: RADIATION ONCOLOGY | Facility: CLINIC | Age: 79
End: 2024-05-23
Payer: MEDICARE

## 2024-05-23 VITALS
DIASTOLIC BLOOD PRESSURE: 73 MMHG | SYSTOLIC BLOOD PRESSURE: 109 MMHG | HEART RATE: 84 BPM | TEMPERATURE: 96.98 F | HEIGHT: 69 IN | OXYGEN SATURATION: 92 % | RESPIRATION RATE: 16 BRPM

## 2024-05-23 PROCEDURE — 99214 OFFICE O/P EST MOD 30 MIN: CPT

## 2024-05-27 NOTE — HISTORY OF PRESENT ILLNESS
[FreeTextEntry1] : Mr. Helio Mcgill is a 80 yo man with h/o pacemaker implantation, radiation for oropharyngeal cancer in 8/2023 presents for F/U.  HPI: Thyroid US incidentally revealed a 2.4cm left level 3 node and 2 cm left level 2a node.  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 Nj 2/9/23 did not show any evidence of disease, recommended EUS with random biopsy of BOT, tonsil, and left neck dissection which was done 2/28/23. Intraoperatively found left level 2 ANTONIO. 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  3/9/23 Consult, in clinic he reports that he is healing well from surgery with no drainage or bleeding from the surgical site and only minor tenderness to palpation. He denies any new growths in the neck and is eating a normal diet. He denies weight loss.   4/13/2023 OTV. 8/33 fractions of radiation to oropharynx completed. c/o cough at times. No pain, dysphagia. Pt has not started chemo yet with Dr Pantoja @ [facility] . Pt will consider feeding tube placement when necessary. Pt has lower O2 Sat since COVID infection about 2 years ago.   6/22/2023 OTV. 14/33 fractions of RT to oropharynx completed. Pt is back for RT from hospitalization for heart condition and pacemaker.  7/6/23 OTV. 15/33 fractions of RT to oropharynx completed. Pt is back for RT from hospitalization for heart condition and pacemaker. c/o minor skin infection over pacemaker placement site.    8/3/23: OTV 36/36 fractions of RT to oropharynx completed. Patient notes some intermittent pain in the throat. He states that it hurts when he swallows. c/o dysphagia. Encouraged to use gabapentin, mouthwash. On Ensure suppl for nutrition. Recent Blood test okay. Pt declined nutrition service advice.  11/2023 -- PET scan showed good response to radiation treatment. ENT Dr Fonseca @ [facility].   5/23/2024 F/U. Pt completed 72 Gy/ 36 Fx of RT oropharynx in 8/2023. c/o burning sensation of tongue to some foods. No dysphagia. Taste has 90% recovery.  Advised to continue f/u with ENT service, PCP.

## 2024-05-27 NOTE — REASON FOR VISIT
[Routine Follow-Up] : routine follow-up visit for [[employer] ] : provided by [employer]   [Interpreters_IDNumber] : [id number] [[id number]] : [place]

## 2024-05-27 NOTE — DISEASE MANAGEMENT
[Clinical] : TNM Stage: c [DOMINIC] : DOMINIC [TTNM] : x [NTNM] : 3b [MTNM] : 0 [de-identified] : 72 Gy [de-identified] : oropharynx

## 2024-06-05 ENCOUNTER — APPOINTMENT (OUTPATIENT)
Dept: ELECTROPHYSIOLOGY | Facility: CLINIC | Age: 79
End: 2024-06-05
Payer: MEDICARE

## 2024-06-05 VITALS
BODY MASS INDEX: 35.84 KG/M2 | DIASTOLIC BLOOD PRESSURE: 80 MMHG | HEART RATE: 85 BPM | HEIGHT: 69 IN | SYSTOLIC BLOOD PRESSURE: 137 MMHG | WEIGHT: 242 LBS

## 2024-06-05 PROCEDURE — 93000 ELECTROCARDIOGRAM COMPLETE: CPT | Mod: 59

## 2024-06-05 PROCEDURE — 93279 PRGRMG DEV EVAL PM/LDLS PM: CPT

## 2024-06-05 PROCEDURE — G2211 COMPLEX E/M VISIT ADD ON: CPT

## 2024-06-05 PROCEDURE — 99215 OFFICE O/P EST HI 40 MIN: CPT

## 2024-06-05 RX ORDER — AMIODARONE HYDROCHLORIDE 200 MG/1
200 TABLET ORAL DAILY
Qty: 30 | Refills: 3 | Status: DISCONTINUED | COMMUNITY
End: 2024-06-05

## 2024-06-05 NOTE — ASSESSMENT
[FreeTextEntry1] : ## Tachy-ej syndrome s/p DC-PPM (Bio, 23, Non-dep) s/p removal and Micra AV2 (MDT, 23, Non-dep) ## Persistent Atrial Fibrillation ## Metastatic SCC ## Cardiotoxic Drug Monitoring  - On Eliquis. Compliant. No bleeding issues. Patient to contact us if there is any bleeding issues, interruption or any issues with OAC. Patient to go to ER/call 911 if any major bleeding. - PPM interrogation shows normally functioning Micra AV2. Battery life ok. - Remains in AF. Continue Metoprolol. Off Amiodarone.  - Echo. - Further management with antiarrhythmic/cardioversion versus ablation depending upon his response to radiation therapy. - We discussed multiple therapeutic options for the treatment of atrial fibrillation, including undergoing an atrial fibrillation/left atrial antral isolation ablation. The details of the procedure and risks associated with undergoing an atrial fibrillation/MIMI ablation were discussed in detail including, but not limited to, death, myocardial ischemia, stroke, cardiac perforation, pulmonary vein stenosis, diaphragmatic paralysis via phrenic nerve injury, catheter entrapment in the mitral valve or other location, bleeding, infection, deep vein thrombosis, vascular injury, and worsening atrial arrhythmias. We also discussed that there is a low risk of possible esophageal ulceration, atrial esophageal fistula, atrial bronchial fistula, or another complication requiring the need for major surgery to address.  We also discussed that recurrent atrial fibrillation in the first 2-3 months post procedure is a part of the healing process and has no impact on the overall longer- term success of the ablation. To try to reduce the incidence of these events, the plan will be for antiarrhythmic therapy to be restarted post procedure and continued for the first 3 months after ablation. - He is agreeable. Will proceed with getting clearance from ENT and radiation/oncology regarding AF ablation (CHRISTOPHER, general anesthesia, need for high dose of anticoagulation).  - Remote Monitoring - RTC in 6 months.

## 2024-06-05 NOTE — ADDENDUM
[FreeTextEntry1] : Anisha CYR assisted in documentation on 06/05/2024 acting as a scribe for Dr. Jackie Recio.

## 2024-06-05 NOTE — HISTORY OF PRESENT ILLNESS
[de-identified] : Mr. DAVIS is a 78 year-year old male with history of non-obs CAD ([facility], 06/22), HTN, DL, GALINA, PVC, persistent atrial fibrillation, Tachy-ej syndrome s/p DC-PPM ([place], 23, Non-dep), COPD, Ex-smoker, Metastatic keratinizing SCC s/p Sx + RT is sent here for management of device.  Feels fine.  RT is limited due to presence of device and being asked to move by Rad-onc expeditely. RT is being planned in both necks and PPM sites on pectoral region.  He is accompanied by his wife.  Lives in [place]. His Daughter is a pathologist in [place].   7/5/23: Feels fine. s/p pacemaker removal and Micra implant. There was a concern about drainage from wound by VNS. 08/09/2023: Feels fine. S/p radiation treatment. Complains of burning in the back of the neck area where the radiation was done. States that radiation is done. Having follow-up scan in a couple of months.  12/06/2023: Feels fine. S/p complete radiation treatment. Cleared by ENT as per the patient. Feels mild PARMAR.   06/05/2024: Feels PARMAR. Feeling okay.   Denies chest pain, shortness of breath, palpitation, dizziness or LOC except noted above.  EKG (06/05/2024): AFL @ 78, , QTc 483, RBBB   EKG (12/06/2023): AF   EKG (08/09/2023): AF @ 77 EKG (07/5/23): AF@76 EKG (6/22/23): AF-

## 2024-06-05 NOTE — PROCEDURE
[No] : not [Atrial Fibrillation] : atrial fibrillation [See Scanned Paceart Report] : See scanned paceart report [See Device Printout] : See device printout [Pacemaker] : pacemaker [DDD] : DDD [Longevity: ___ months] : The estimated remaining battery life is [unfilled] months [Threshold Testing Performed] : Threshold testing was performed [Ventricular] : Ventricular [Lead Imp:  ___ohms] : lead impedance was [unfilled] ohms [Sensing Amplitude ___mv] : sensing amplitude was [unfilled] mv [___V @] : [unfilled] V [___ ms] : [unfilled] ms [de-identified] : KATHARINE [de-identified] : [id number] [de-identified] : [id number] [de-identified] : 6/26/23 [de-identified] : VDD/VVI+ [de-identified] : programmed to VVIR [de-identified] : Reprogrammed to VVIR AVConduction (aka VVI+ mode): 54.0% AM- 15.0% VS only 76.9%  only 7.7%

## 2024-06-20 ENCOUNTER — APPOINTMENT (OUTPATIENT)
Dept: CARDIOLOGY | Facility: CLINIC | Age: 79
End: 2024-06-20
Payer: MEDICARE

## 2024-06-20 PROCEDURE — 93306 TTE W/DOPPLER COMPLETE: CPT

## 2024-07-26 ENCOUNTER — APPOINTMENT (OUTPATIENT)
Dept: ELECTROPHYSIOLOGY | Facility: HOSPITAL | Age: 79
End: 2024-07-26

## 2024-09-05 ENCOUNTER — APPOINTMENT (OUTPATIENT)
Dept: CARDIOLOGY | Facility: CLINIC | Age: 79
End: 2024-09-05

## 2024-10-03 ENCOUNTER — NON-APPOINTMENT (OUTPATIENT)
Age: 79
End: 2024-10-03

## 2024-10-03 ENCOUNTER — APPOINTMENT (OUTPATIENT)
Dept: CARDIOLOGY | Facility: CLINIC | Age: 79
End: 2024-10-03
Payer: MEDICARE

## 2024-10-03 PROCEDURE — 93294 REM INTERROG EVL PM/LDLS PM: CPT

## 2024-10-03 PROCEDURE — 93296 REM INTERROG EVL PM/IDS: CPT

## 2024-12-04 ENCOUNTER — APPOINTMENT (OUTPATIENT)
Dept: ELECTROPHYSIOLOGY | Facility: CLINIC | Age: 79
End: 2024-12-04
Payer: MEDICARE

## 2024-12-04 VITALS
BODY MASS INDEX: 34.8 KG/M2 | WEIGHT: 235 LBS | SYSTOLIC BLOOD PRESSURE: 123 MMHG | DIASTOLIC BLOOD PRESSURE: 70 MMHG | HEART RATE: 85 BPM | HEIGHT: 69 IN

## 2024-12-04 DIAGNOSIS — R06.00 DYSPNEA, UNSPECIFIED: ICD-10-CM

## 2024-12-04 DIAGNOSIS — I25.10 ATHEROSCLEROTIC HEART DISEASE OF NATIVE CORONARY ARTERY W/OUT ANGINA PECTORIS: ICD-10-CM

## 2024-12-04 PROCEDURE — 93000 ELECTROCARDIOGRAM COMPLETE: CPT | Mod: 59

## 2024-12-04 PROCEDURE — 93279 PRGRMG DEV EVAL PM/LDLS PM: CPT

## 2024-12-04 PROCEDURE — G2211 COMPLEX E/M VISIT ADD ON: CPT

## 2024-12-04 PROCEDURE — 99215 OFFICE O/P EST HI 40 MIN: CPT

## 2024-12-06 ENCOUNTER — APPOINTMENT (OUTPATIENT)
Dept: RADIATION ONCOLOGY | Facility: CLINIC | Age: 79
End: 2024-12-06
Payer: MEDICARE

## 2024-12-06 VITALS — WEIGHT: 235 LBS | BODY MASS INDEX: 34.8 KG/M2 | HEIGHT: 69 IN | RESPIRATION RATE: 18 BRPM

## 2024-12-06 PROCEDURE — 99214 OFFICE O/P EST MOD 30 MIN: CPT

## 2024-12-16 ENCOUNTER — OUTPATIENT (OUTPATIENT)
Dept: OUTPATIENT SERVICES | Facility: HOSPITAL | Age: 79
LOS: 1 days | End: 2024-12-16
Payer: MEDICARE

## 2024-12-16 ENCOUNTER — RESULT REVIEW (OUTPATIENT)
Age: 79
End: 2024-12-16

## 2024-12-16 DIAGNOSIS — Z92.21 PERSONAL HISTORY OF ANTINEOPLASTIC CHEMOTHERAPY: Chronic | ICD-10-CM

## 2024-12-16 DIAGNOSIS — Z95.5 PRESENCE OF CORONARY ANGIOPLASTY IMPLANT AND GRAFT: Chronic | ICD-10-CM

## 2024-12-16 DIAGNOSIS — C14.0 MALIGNANT NEOPLASM OF PHARYNX, UNSPECIFIED: Chronic | ICD-10-CM

## 2024-12-16 DIAGNOSIS — I48.91 UNSPECIFIED ATRIAL FIBRILLATION: Chronic | ICD-10-CM

## 2024-12-16 DIAGNOSIS — Z00.8 ENCOUNTER FOR OTHER GENERAL EXAMINATION: ICD-10-CM

## 2024-12-16 DIAGNOSIS — R06.00 DYSPNEA, UNSPECIFIED: ICD-10-CM

## 2024-12-16 DIAGNOSIS — I44.2 ATRIOVENTRICULAR BLOCK, COMPLETE: ICD-10-CM

## 2024-12-16 DIAGNOSIS — Z92.3 PERSONAL HISTORY OF IRRADIATION: Chronic | ICD-10-CM

## 2024-12-16 PROCEDURE — A9500: CPT

## 2024-12-16 PROCEDURE — 93018 CV STRESS TEST I&R ONLY: CPT

## 2024-12-16 PROCEDURE — 78452 HT MUSCLE IMAGE SPECT MULT: CPT

## 2024-12-16 PROCEDURE — 78452 HT MUSCLE IMAGE SPECT MULT: CPT | Mod: 26,MH

## 2024-12-17 DIAGNOSIS — I44.2 ATRIOVENTRICULAR BLOCK, COMPLETE: ICD-10-CM

## 2024-12-17 DIAGNOSIS — R06.00 DYSPNEA, UNSPECIFIED: ICD-10-CM

## 2025-01-17 ENCOUNTER — APPOINTMENT (OUTPATIENT)
Dept: ELECTROPHYSIOLOGY | Facility: HOSPITAL | Age: 80
End: 2025-01-17

## 2025-01-31 ENCOUNTER — INPATIENT (INPATIENT)
Facility: HOSPITAL | Age: 80
LOS: 0 days | Discharge: HOME HEALTH | DRG: 274 | End: 2025-02-01
Attending: STUDENT IN AN ORGANIZED HEALTH CARE EDUCATION/TRAINING PROGRAM | Admitting: STUDENT IN AN ORGANIZED HEALTH CARE EDUCATION/TRAINING PROGRAM
Payer: MEDICARE

## 2025-01-31 ENCOUNTER — APPOINTMENT (OUTPATIENT)
Dept: ELECTROPHYSIOLOGY | Facility: HOSPITAL | Age: 80
End: 2025-01-31

## 2025-01-31 ENCOUNTER — TRANSCRIPTION ENCOUNTER (OUTPATIENT)
Age: 80
End: 2025-01-31

## 2025-01-31 VITALS
DIASTOLIC BLOOD PRESSURE: 94 MMHG | OXYGEN SATURATION: 95 % | WEIGHT: 229.94 LBS | HEIGHT: 69 IN | TEMPERATURE: 97 F | HEART RATE: 75 BPM | SYSTOLIC BLOOD PRESSURE: 151 MMHG | RESPIRATION RATE: 16 BRPM

## 2025-01-31 DIAGNOSIS — Z92.3 PERSONAL HISTORY OF IRRADIATION: Chronic | ICD-10-CM

## 2025-01-31 DIAGNOSIS — I48.91 UNSPECIFIED ATRIAL FIBRILLATION: Chronic | ICD-10-CM

## 2025-01-31 DIAGNOSIS — C14.0 MALIGNANT NEOPLASM OF PHARYNX, UNSPECIFIED: Chronic | ICD-10-CM

## 2025-01-31 DIAGNOSIS — I48.91 UNSPECIFIED ATRIAL FIBRILLATION: ICD-10-CM

## 2025-01-31 DIAGNOSIS — R06.02 SHORTNESS OF BREATH: ICD-10-CM

## 2025-01-31 DIAGNOSIS — Z95.5 PRESENCE OF CORONARY ANGIOPLASTY IMPLANT AND GRAFT: Chronic | ICD-10-CM

## 2025-01-31 DIAGNOSIS — Z92.21 PERSONAL HISTORY OF ANTINEOPLASTIC CHEMOTHERAPY: Chronic | ICD-10-CM

## 2025-01-31 LAB
ANION GAP SERPL CALC-SCNC: 12 MMOL/L — SIGNIFICANT CHANGE UP (ref 7–14)
APTT BLD: 35.1 SEC — SIGNIFICANT CHANGE UP (ref 27–39.2)
BLD GP AB SCN SERPL QL: SIGNIFICANT CHANGE UP
BUN SERPL-MCNC: 15 MG/DL — SIGNIFICANT CHANGE UP (ref 10–20)
CALCIUM SERPL-MCNC: 9 MG/DL — SIGNIFICANT CHANGE UP (ref 8.4–10.5)
CHLORIDE SERPL-SCNC: 102 MMOL/L — SIGNIFICANT CHANGE UP (ref 98–110)
CO2 SERPL-SCNC: 26 MMOL/L — SIGNIFICANT CHANGE UP (ref 17–32)
CREAT SERPL-MCNC: 1 MG/DL — SIGNIFICANT CHANGE UP (ref 0.7–1.5)
EGFR: 77 ML/MIN/1.73M2 — SIGNIFICANT CHANGE UP
EGFR: 77 ML/MIN/1.73M2 — SIGNIFICANT CHANGE UP
GLUCOSE SERPL-MCNC: 86 MG/DL — SIGNIFICANT CHANGE UP (ref 70–99)
HCT VFR BLD CALC: 44.6 % — SIGNIFICANT CHANGE UP (ref 42–52)
HGB BLD-MCNC: 14.8 G/DL — SIGNIFICANT CHANGE UP (ref 14–18)
INR BLD: 1.21 RATIO — SIGNIFICANT CHANGE UP (ref 0.65–1.3)
MCHC RBC-ENTMCNC: 31.5 PG — HIGH (ref 27–31)
MCHC RBC-ENTMCNC: 33.2 G/DL — SIGNIFICANT CHANGE UP (ref 32–37)
MCV RBC AUTO: 94.9 FL — HIGH (ref 80–94)
NRBC # BLD: 0 /100 WBCS — SIGNIFICANT CHANGE UP (ref 0–0)
NRBC BLD-RTO: 0 /100 WBCS — SIGNIFICANT CHANGE UP (ref 0–0)
PLATELET # BLD AUTO: 128 K/UL — LOW (ref 130–400)
PMV BLD: 9.9 FL — SIGNIFICANT CHANGE UP (ref 7.4–10.4)
POTASSIUM SERPL-MCNC: 4.4 MMOL/L — SIGNIFICANT CHANGE UP (ref 3.5–5)
POTASSIUM SERPL-SCNC: 4.4 MMOL/L — SIGNIFICANT CHANGE UP (ref 3.5–5)
PROTHROM AB SERPL-ACNC: 14.3 SEC — HIGH (ref 9.95–12.87)
RBC # BLD: 4.7 M/UL — SIGNIFICANT CHANGE UP (ref 4.7–6.1)
RBC # FLD: 13.2 % — SIGNIFICANT CHANGE UP (ref 11.5–14.5)
SODIUM SERPL-SCNC: 140 MMOL/L — SIGNIFICANT CHANGE UP (ref 135–146)
WBC # BLD: 6.41 K/UL — SIGNIFICANT CHANGE UP (ref 4.8–10.8)
WBC # FLD AUTO: 6.41 K/UL — SIGNIFICANT CHANGE UP (ref 4.8–10.8)

## 2025-01-31 PROCEDURE — 85730 THROMBOPLASTIN TIME PARTIAL: CPT

## 2025-01-31 PROCEDURE — C1759: CPT

## 2025-01-31 PROCEDURE — 93005 ELECTROCARDIOGRAM TRACING: CPT

## 2025-01-31 PROCEDURE — C1733: CPT

## 2025-01-31 PROCEDURE — 93623 PRGRMD STIMJ&PACG IV RX NFS: CPT

## 2025-01-31 PROCEDURE — 86850 RBC ANTIBODY SCREEN: CPT

## 2025-01-31 PROCEDURE — 86901 BLOOD TYPING SEROLOGIC RH(D): CPT

## 2025-01-31 PROCEDURE — 80048 BASIC METABOLIC PNL TOTAL CA: CPT

## 2025-01-31 PROCEDURE — 85610 PROTHROMBIN TIME: CPT

## 2025-01-31 PROCEDURE — 93657 TX L/R ATRIAL FIB ADDL: CPT

## 2025-01-31 PROCEDURE — 86900 BLOOD TYPING SEROLOGIC ABO: CPT

## 2025-01-31 PROCEDURE — C1894: CPT

## 2025-01-31 PROCEDURE — 85027 COMPLETE CBC AUTOMATED: CPT

## 2025-01-31 PROCEDURE — 93656 COMPRE EP EVAL ABLTJ ATR FIB: CPT

## 2025-01-31 PROCEDURE — 93655 ICAR CATH ABLTJ DSCRT ARRHYT: CPT

## 2025-01-31 PROCEDURE — C1730: CPT

## 2025-01-31 PROCEDURE — 93318 ECHO TRANSESOPHAGEAL INTRAOP: CPT

## 2025-01-31 PROCEDURE — 93623 PRGRMD STIMJ&PACG IV RX NFS: CPT | Mod: 26

## 2025-01-31 PROCEDURE — 93279 PRGRMG DEV EVAL PM/LDLS PM: CPT | Mod: XU

## 2025-01-31 PROCEDURE — 93656 COMPRE EP EVAL ABLTJ ATR FIB: CPT | Mod: XU

## 2025-01-31 PROCEDURE — C1766: CPT

## 2025-01-31 PROCEDURE — 93655 ICAR CATH ABLTJ DSCRT ARRHYT: CPT | Mod: XU

## 2025-01-31 PROCEDURE — 36415 COLL VENOUS BLD VENIPUNCTURE: CPT

## 2025-01-31 PROCEDURE — C9399: CPT

## 2025-01-31 PROCEDURE — 93657 TX L/R ATRIAL FIB ADDL: CPT | Mod: XU

## 2025-01-31 PROCEDURE — C1769: CPT

## 2025-01-31 RX ORDER — LOSARTAN POTASSIUM 100 MG/1
100 TABLET, FILM COATED ORAL DAILY
Refills: 0 | Status: DISCONTINUED | OUTPATIENT
Start: 2025-01-31 | End: 2025-02-01

## 2025-01-31 RX ORDER — ALBUTEROL SULFATE 2.5 MG/3ML
2.5 VIAL, NEBULIZER (ML) INHALATION
Refills: 0 | Status: DISCONTINUED | OUTPATIENT
Start: 2025-01-31 | End: 2025-02-01

## 2025-01-31 RX ORDER — METOPROLOL SUCCINATE 50 MG/1
100 TABLET, EXTENDED RELEASE ORAL DAILY
Refills: 0 | Status: DISCONTINUED | OUTPATIENT
Start: 2025-01-31 | End: 2025-02-01

## 2025-01-31 RX ORDER — VANCOMYCIN HCL IN 5 % DEXTROSE 1.5G/250ML
1500 PLASTIC BAG, INJECTION (ML) INTRAVENOUS ONCE
Refills: 0 | Status: COMPLETED | OUTPATIENT
Start: 2025-01-31 | End: 2025-01-31

## 2025-01-31 RX ORDER — ROSUVASTATIN CALCIUM 20 MG/1
20 TABLET, FILM COATED ORAL AT BEDTIME
Refills: 0 | Status: DISCONTINUED | OUTPATIENT
Start: 2025-01-31 | End: 2025-02-01

## 2025-01-31 RX ORDER — FUROSEMIDE 10 MG/ML
40 INJECTION INTRAMUSCULAR; INTRAVENOUS DAILY
Refills: 0 | Status: DISCONTINUED | OUTPATIENT
Start: 2025-01-31 | End: 2025-02-01

## 2025-01-31 RX ORDER — APIXABAN 2.5 MG/1
5 TABLET, FILM COATED ORAL EVERY 12 HOURS
Refills: 0 | Status: DISCONTINUED | OUTPATIENT
Start: 2025-01-31 | End: 2025-02-01

## 2025-01-31 RX ORDER — NIFEDIPINE 30 MG
30 TABLET, EXTENDED RELEASE 24 HR ORAL DAILY
Refills: 0 | Status: DISCONTINUED | OUTPATIENT
Start: 2025-01-31 | End: 2025-02-01

## 2025-01-31 RX ORDER — TRAMADOL HYDROCHLORIDE 50 MG/1
25 TABLET, FILM COATED ORAL ONCE
Refills: 0 | Status: DISCONTINUED | OUTPATIENT
Start: 2025-01-31 | End: 2025-01-31

## 2025-01-31 RX ADMIN — TRAMADOL HYDROCHLORIDE 25 MILLIGRAM(S): 50 TABLET, FILM COATED ORAL at 18:13

## 2025-01-31 RX ADMIN — ROSUVASTATIN CALCIUM 20 MILLIGRAM(S): 20 TABLET, FILM COATED ORAL at 21:13

## 2025-01-31 RX ADMIN — TRAMADOL HYDROCHLORIDE 25 MILLIGRAM(S): 50 TABLET, FILM COATED ORAL at 17:13

## 2025-01-31 RX ADMIN — APIXABAN 5 MILLIGRAM(S): 2.5 TABLET, FILM COATED ORAL at 16:07

## 2025-02-01 ENCOUNTER — TRANSCRIPTION ENCOUNTER (OUTPATIENT)
Age: 80
End: 2025-02-01

## 2025-02-01 VITALS
SYSTOLIC BLOOD PRESSURE: 124 MMHG | DIASTOLIC BLOOD PRESSURE: 73 MMHG | HEART RATE: 51 BPM | TEMPERATURE: 97 F | OXYGEN SATURATION: 95 % | RESPIRATION RATE: 20 BRPM

## 2025-02-01 LAB
ANION GAP SERPL CALC-SCNC: 10 MMOL/L — SIGNIFICANT CHANGE UP (ref 7–14)
BUN SERPL-MCNC: 21 MG/DL — HIGH (ref 10–20)
CALCIUM SERPL-MCNC: 8.3 MG/DL — LOW (ref 8.4–10.5)
CHLORIDE SERPL-SCNC: 104 MMOL/L — SIGNIFICANT CHANGE UP (ref 98–110)
CO2 SERPL-SCNC: 29 MMOL/L — SIGNIFICANT CHANGE UP (ref 17–32)
CREAT SERPL-MCNC: 0.9 MG/DL — SIGNIFICANT CHANGE UP (ref 0.7–1.5)
EGFR: 87 ML/MIN/1.73M2 — SIGNIFICANT CHANGE UP
EGFR: 87 ML/MIN/1.73M2 — SIGNIFICANT CHANGE UP
GLUCOSE SERPL-MCNC: 125 MG/DL — HIGH (ref 70–99)
POTASSIUM SERPL-MCNC: 5.1 MMOL/L — HIGH (ref 3.5–5)
POTASSIUM SERPL-SCNC: 5.1 MMOL/L — HIGH (ref 3.5–5)
SODIUM SERPL-SCNC: 143 MMOL/L — SIGNIFICANT CHANGE UP (ref 135–146)

## 2025-02-01 PROCEDURE — 93010 ELECTROCARDIOGRAM REPORT: CPT

## 2025-02-01 PROCEDURE — 99232 SBSQ HOSP IP/OBS MODERATE 35: CPT | Mod: FS

## 2025-02-01 PROCEDURE — 99239 HOSP IP/OBS DSCHRG MGMT >30: CPT

## 2025-02-01 RX ORDER — APIXABAN 2.5 MG/1
1 TABLET, FILM COATED ORAL
Refills: 0 | DISCHARGE

## 2025-02-01 RX ORDER — METOPROLOL SUCCINATE 50 MG/1
1 TABLET, EXTENDED RELEASE ORAL
Qty: 0 | Refills: 0 | DISCHARGE

## 2025-02-01 RX ADMIN — Medication 30 MILLIGRAM(S): at 05:33

## 2025-02-01 RX ADMIN — FUROSEMIDE 40 MILLIGRAM(S): 10 INJECTION INTRAMUSCULAR; INTRAVENOUS at 05:34

## 2025-02-01 RX ADMIN — LOSARTAN POTASSIUM 100 MILLIGRAM(S): 100 TABLET, FILM COATED ORAL at 05:33

## 2025-02-01 RX ADMIN — APIXABAN 5 MILLIGRAM(S): 2.5 TABLET, FILM COATED ORAL at 05:34

## 2025-02-01 RX ADMIN — METOPROLOL SUCCINATE 100 MILLIGRAM(S): 50 TABLET, EXTENDED RELEASE ORAL at 05:33

## 2025-02-11 ENCOUNTER — NON-APPOINTMENT (OUTPATIENT)
Age: 80
End: 2025-02-11

## 2025-02-11 ENCOUNTER — APPOINTMENT (OUTPATIENT)
Dept: CARDIOLOGY | Facility: CLINIC | Age: 80
End: 2025-02-11
Payer: MEDICARE

## 2025-02-11 VITALS
DIASTOLIC BLOOD PRESSURE: 68 MMHG | HEIGHT: 69 IN | SYSTOLIC BLOOD PRESSURE: 142 MMHG | BODY MASS INDEX: 34.8 KG/M2 | WEIGHT: 235 LBS | HEART RATE: 48 BPM

## 2025-02-11 DIAGNOSIS — Z99.81 DEPENDENCE ON SUPPLEMENTAL OXYGEN: ICD-10-CM

## 2025-02-11 DIAGNOSIS — I49.5 SICK SINUS SYNDROME: ICD-10-CM

## 2025-02-11 DIAGNOSIS — I10 ESSENTIAL (PRIMARY) HYPERTENSION: ICD-10-CM

## 2025-02-11 DIAGNOSIS — J44.9 CHRONIC OBSTRUCTIVE PULMONARY DISEASE, UNSPECIFIED: ICD-10-CM

## 2025-02-11 DIAGNOSIS — Z87.891 PERSONAL HISTORY OF NICOTINE DEPENDENCE: ICD-10-CM

## 2025-02-11 DIAGNOSIS — I25.10 ATHEROSCLEROTIC HEART DISEASE OF NATIVE CORONARY ARTERY WITHOUT ANGINA PECTORIS: ICD-10-CM

## 2025-02-11 DIAGNOSIS — G47.33 OBSTRUCTIVE SLEEP APNEA (ADULT) (PEDIATRIC): ICD-10-CM

## 2025-02-11 DIAGNOSIS — Z95.0 PRESENCE OF CARDIAC PACEMAKER: ICD-10-CM

## 2025-02-11 DIAGNOSIS — Z85.828 PERSONAL HISTORY OF OTHER MALIGNANT NEOPLASM OF SKIN: ICD-10-CM

## 2025-02-11 DIAGNOSIS — E78.5 HYPERLIPIDEMIA, UNSPECIFIED: ICD-10-CM

## 2025-02-11 DIAGNOSIS — Z92.3 PERSONAL HISTORY OF IRRADIATION: ICD-10-CM

## 2025-02-11 DIAGNOSIS — I48.92 UNSPECIFIED ATRIAL FLUTTER: ICD-10-CM

## 2025-02-11 DIAGNOSIS — C79.9 SECONDARY MALIGNANT NEOPLASM OF UNSPECIFIED SITE: ICD-10-CM

## 2025-02-11 DIAGNOSIS — I48.19 OTHER PERSISTENT ATRIAL FIBRILLATION: ICD-10-CM

## 2025-02-11 DIAGNOSIS — Z79.01 LONG TERM (CURRENT) USE OF ANTICOAGULANTS: ICD-10-CM

## 2025-02-11 DIAGNOSIS — I48.91 UNSPECIFIED ATRIAL FIBRILLATION: ICD-10-CM

## 2025-02-11 PROCEDURE — 93000 ELECTROCARDIOGRAM COMPLETE: CPT

## 2025-02-11 PROCEDURE — 99214 OFFICE O/P EST MOD 30 MIN: CPT

## 2025-02-27 ENCOUNTER — APPOINTMENT (OUTPATIENT)
Facility: CLINIC | Age: 80
End: 2025-02-27
Payer: MEDICARE

## 2025-02-27 VITALS
BODY MASS INDEX: 34.8 KG/M2 | HEIGHT: 69 IN | SYSTOLIC BLOOD PRESSURE: 120 MMHG | OXYGEN SATURATION: 90 % | RESPIRATION RATE: 14 BRPM | DIASTOLIC BLOOD PRESSURE: 70 MMHG | WEIGHT: 235 LBS | HEART RATE: 82 BPM

## 2025-02-27 PROCEDURE — 93000 ELECTROCARDIOGRAM COMPLETE: CPT | Mod: 59

## 2025-02-27 PROCEDURE — 93279 PRGRMG DEV EVAL PM/LDLS PM: CPT

## 2025-02-27 PROCEDURE — 99214 OFFICE O/P EST MOD 30 MIN: CPT

## 2025-03-07 ENCOUNTER — APPOINTMENT (OUTPATIENT)
Dept: RADIATION ONCOLOGY | Facility: CLINIC | Age: 80
End: 2025-03-07

## 2025-03-07 VITALS — BODY MASS INDEX: 34.07 KG/M2 | WEIGHT: 230 LBS | RESPIRATION RATE: 18 BRPM | HEIGHT: 69 IN

## 2025-03-07 PROCEDURE — 99214 OFFICE O/P EST MOD 30 MIN: CPT

## 2025-04-15 ENCOUNTER — APPOINTMENT (OUTPATIENT)
Dept: CARDIOLOGY | Facility: CLINIC | Age: 80
End: 2025-04-15
Payer: MEDICARE

## 2025-04-15 VITALS
HEART RATE: 74 BPM | WEIGHT: 225 LBS | BODY MASS INDEX: 33.33 KG/M2 | DIASTOLIC BLOOD PRESSURE: 60 MMHG | SYSTOLIC BLOOD PRESSURE: 78 MMHG | HEIGHT: 69 IN

## 2025-04-15 VITALS — DIASTOLIC BLOOD PRESSURE: 60 MMHG | SYSTOLIC BLOOD PRESSURE: 90 MMHG

## 2025-04-15 DIAGNOSIS — I48.91 UNSPECIFIED ATRIAL FIBRILLATION: ICD-10-CM

## 2025-04-15 DIAGNOSIS — I49.5 SICK SINUS SYNDROME: ICD-10-CM

## 2025-04-15 DIAGNOSIS — I44.2 ATRIOVENTRICULAR BLOCK, COMPLETE: ICD-10-CM

## 2025-04-15 DIAGNOSIS — I25.10 ATHEROSCLEROTIC HEART DISEASE OF NATIVE CORONARY ARTERY W/OUT ANGINA PECTORIS: ICD-10-CM

## 2025-04-15 DIAGNOSIS — I10 ESSENTIAL (PRIMARY) HYPERTENSION: ICD-10-CM

## 2025-04-15 DIAGNOSIS — E78.5 HYPERLIPIDEMIA, UNSPECIFIED: ICD-10-CM

## 2025-04-15 DIAGNOSIS — Z95.0 PRESENCE OF CARDIAC PACEMAKER: ICD-10-CM

## 2025-04-15 PROCEDURE — 99214 OFFICE O/P EST MOD 30 MIN: CPT

## 2025-04-15 PROCEDURE — 93880 EXTRACRANIAL BILAT STUDY: CPT

## 2025-04-15 PROCEDURE — 93000 ELECTROCARDIOGRAM COMPLETE: CPT

## 2025-05-29 ENCOUNTER — APPOINTMENT (OUTPATIENT)
Dept: CARDIOLOGY | Facility: CLINIC | Age: 80
End: 2025-05-29

## 2025-07-03 ENCOUNTER — NON-APPOINTMENT (OUTPATIENT)
Age: 80
End: 2025-07-03

## 2025-07-03 ENCOUNTER — APPOINTMENT (OUTPATIENT)
Dept: CARDIOLOGY | Facility: CLINIC | Age: 80
End: 2025-07-03
Payer: MEDICARE

## 2025-07-03 PROCEDURE — 93294 REM INTERROG EVL PM/LDLS PM: CPT

## 2025-07-03 PROCEDURE — 93296 REM INTERROG EVL PM/IDS: CPT

## 2025-08-28 ENCOUNTER — APPOINTMENT (OUTPATIENT)
Facility: CLINIC | Age: 80
End: 2025-08-28

## 2025-08-28 VITALS
DIASTOLIC BLOOD PRESSURE: 74 MMHG | HEIGHT: 69 IN | HEART RATE: 91 BPM | SYSTOLIC BLOOD PRESSURE: 110 MMHG | BODY MASS INDEX: 33.33 KG/M2 | WEIGHT: 225 LBS | OXYGEN SATURATION: 96 %

## 2025-08-28 PROCEDURE — 93279 PRGRMG DEV EVAL PM/LDLS PM: CPT

## 2025-08-28 PROCEDURE — 99214 OFFICE O/P EST MOD 30 MIN: CPT

## 2025-08-28 PROCEDURE — 93000 ELECTROCARDIOGRAM COMPLETE: CPT | Mod: 59

## 2025-09-05 ENCOUNTER — APPOINTMENT (OUTPATIENT)
Dept: CARDIOLOGY | Facility: CLINIC | Age: 80
End: 2025-09-05
Payer: MEDICARE

## 2025-09-05 VITALS
HEIGHT: 69 IN | WEIGHT: 230 LBS | SYSTOLIC BLOOD PRESSURE: 104 MMHG | BODY MASS INDEX: 34.07 KG/M2 | DIASTOLIC BLOOD PRESSURE: 74 MMHG | HEART RATE: 81 BPM

## 2025-09-05 DIAGNOSIS — I10 ESSENTIAL (PRIMARY) HYPERTENSION: ICD-10-CM

## 2025-09-05 DIAGNOSIS — I49.5 SICK SINUS SYNDROME: ICD-10-CM

## 2025-09-05 DIAGNOSIS — I25.10 ATHEROSCLEROTIC HEART DISEASE OF NATIVE CORONARY ARTERY W/OUT ANGINA PECTORIS: ICD-10-CM

## 2025-09-05 DIAGNOSIS — I48.91 UNSPECIFIED ATRIAL FIBRILLATION: ICD-10-CM

## 2025-09-05 DIAGNOSIS — E78.5 HYPERLIPIDEMIA, UNSPECIFIED: ICD-10-CM

## 2025-09-05 PROCEDURE — 99214 OFFICE O/P EST MOD 30 MIN: CPT

## 2025-09-05 PROCEDURE — 93000 ELECTROCARDIOGRAM COMPLETE: CPT

## 2025-09-10 ENCOUNTER — APPOINTMENT (OUTPATIENT)
Dept: RADIATION ONCOLOGY | Facility: CLINIC | Age: 80
End: 2025-09-10
Payer: MEDICARE

## 2025-09-10 PROCEDURE — 99214 OFFICE O/P EST MOD 30 MIN: CPT
